# Patient Record
Sex: FEMALE | Race: BLACK OR AFRICAN AMERICAN | URBAN - METROPOLITAN AREA
[De-identification: names, ages, dates, MRNs, and addresses within clinical notes are randomized per-mention and may not be internally consistent; named-entity substitution may affect disease eponyms.]

---

## 2016-04-07 LAB — MAMMOGRAPHY, EXTERNAL: NORMAL

## 2017-01-06 ENCOUNTER — OFFICE VISIT (OUTPATIENT)
Dept: FAMILY MEDICINE CLINIC | Age: 67
End: 2017-01-06

## 2017-01-06 VITALS
SYSTOLIC BLOOD PRESSURE: 120 MMHG | OXYGEN SATURATION: 97 % | WEIGHT: 213 LBS | TEMPERATURE: 98.3 F | HEART RATE: 83 BPM | RESPIRATION RATE: 18 BRPM | BODY MASS INDEX: 33.43 KG/M2 | DIASTOLIC BLOOD PRESSURE: 90 MMHG | HEIGHT: 67 IN

## 2017-01-06 DIAGNOSIS — E78.2 MIXED HYPERLIPIDEMIA: Primary | ICD-10-CM

## 2017-01-06 DIAGNOSIS — E55.9 VITAMIN D DEFICIENCY: ICD-10-CM

## 2017-01-06 DIAGNOSIS — Z13.5 GLAUCOMA SCREENING: ICD-10-CM

## 2017-01-06 PROBLEM — G89.29 CHRONIC LEFT-SIDED LOW BACK PAIN WITH SCIATICA: Status: ACTIVE | Noted: 2017-01-06

## 2017-01-06 PROBLEM — M54.40 CHRONIC LEFT-SIDED LOW BACK PAIN WITH SCIATICA: Status: ACTIVE | Noted: 2017-01-06

## 2017-01-06 RX ORDER — ATORVASTATIN CALCIUM 20 MG/1
20 TABLET, FILM COATED ORAL DAILY
Qty: 90 TAB | Refills: 2 | Status: SHIPPED | OUTPATIENT
Start: 2017-01-06 | End: 2018-02-15 | Stop reason: SDUPTHER

## 2017-01-06 RX ORDER — DICLOFENAC SODIUM 10 MG/G
GEL TOPICAL 4 TIMES DAILY
Qty: 400 G | Refills: 1 | Status: SHIPPED | OUTPATIENT
Start: 2017-01-06 | End: 2017-03-29 | Stop reason: ALTCHOICE

## 2017-01-06 NOTE — PATIENT INSTRUCTIONS

## 2017-01-06 NOTE — PROGRESS NOTES
Gretta Spatz is a 74 y.o. female  Pt here today for follow up visit. 1. Have you been to the ER, urgent care clinic since your last visit? Hospitalized since your last visit? No    2. Have you seen or consulted any other health care providers outside of the [address] [place] [address] since your last visit? Include any pap smears or colon screening.  Yes Where: pain management

## 2017-01-06 NOTE — PROGRESS NOTES
Assessment/Plan:    Sven De Leon was seen today for hypertension. Diagnoses and all orders for this visit:    Mixed hyperlipidemia  -     atorvastatin (LIPITOR) 20 mg tablet; Take 1 Tab by mouth daily.  -     CBC WITH AUTOMATED DIFF; Future  -     HEPATIC FUNCTION PANEL; Future  -     LIPID PANEL; Future  -     METABOLIC PANEL, BASIC; Future  -     TSH 3RD GENERATION; Future  -     T4, FREE; Future  -     URINALYSIS W/ RFLX MICROSCOPIC; Future  -     VITAMIN D, 25 HYDROXY; Future    Glaucoma screening  -     REFERRAL TO OPHTHALMOLOGY    Vitamin D deficiency  -     VITAMIN D, 25 HYDROXY; Future    Other orders  -     diclofenac (VOLTAREN) 1 % gel; Apply  to affected area four (4) times daily. Will get a BP machine and start checking 1-2 times daily. Return in 6 weeks with log. Will then decide whether we need to start treating her for HTN. Physical in May 2017. BW. The plan was discussed with the patient. The patient verbalized understanding and is in agreement with the plan. All medication potential side effects were discussed with the patient.    -------------------------------------------------------------------------------------------------------------------        Tyler Navarro is a 74 y.o. female and presents with Hypertension         Subjective:  Pt here for BP elevated on the last visit. The diastolic is still elevated. She feels fine. ROS:  Constitutional: No recent weight change. No weakness/fatigue. No f/c. Skin: No rashes, change in nails/hair, itching   HENT: No HA, dizziness. No hearing loss/tinnitus. No nasal congestion/discharge. Eyes: No change in vision, double/blurred vision or eye pain/redness. Cardiovascular: No CP/palpitations. No GUTIÉRREZ/orthopnea/PND. Respiratory: No cough/sputum, dyspnea, wheezing. Gastointestinal: No dysphagia, reflux. No n/v. No constipation/diarrhea. No melena/rectal bleeding.    Genitourinary: No dysuria, urinary hesitancy, nocturia, hematuria. No incontinence. Musculoskeletal: No joint pain/stiffness. No muscle pain/tenderness. Endo: No heat/cold intolerance, no polyuria/polydypsia. Heme: No h/o anemia. No easy bleeding/bruising. Allergy/Immunology: No seasonal rhinitis. Denies frequent colds, sinus/ear infections. Neurological: No seizures/numbness/weakness. No paresthesias. Psychiatric:  No depression, anxiety. The problem list was updated as a part of today's visit. Patient Active Problem List   Diagnosis Code    HLD (hyperlipidemia) E78.5    GERD (gastroesophageal reflux disease) K21.9    Sciatica of left side M54.32    Osteoarthritis of both knees M17.0    Vitamin D deficiency E55.9    Advance directive discussed with patient Z70.80    Chronic left-sided low back pain with sciatica M54.40, G89.29       The PSH, FH were reviewed. SH:  Social History   Substance Use Topics    Smoking status: Former Smoker     Packs/day: 1.00     Quit date: 8/1/1993    Smokeless tobacco: Never Used    Alcohol use Yes      Comment: once a month, 1 mixed drink       Medications/Allergies:  Current Outpatient Prescriptions on File Prior to Visit   Medication Sig Dispense Refill    coenzyme q10 10 mg cap Take  by mouth.  omeprazole (PRILOSEC) 20 mg capsule Take 1 Cap by mouth daily. 90 Cap 3    ergocalciferol (VITAMIN D2) 50,000 unit capsule Take 1 Cap by mouth every seven (7) days. 12 Cap 3     No current facility-administered medications on file prior to visit.          No Known Allergies      Health Maintenance:   Health Maintenance   Topic Date Due    MEDICARE YEARLY EXAM  09/23/2016    GLAUCOMA SCREENING Q2Y  10/01/2016    BREAST CANCER SCRN MAMMOGRAM  04/06/2017    Pneumococcal 65+ Low/Medium Risk (2 of 2 - PPSV23) 10/13/2017    COLONOSCOPY  12/31/2024    DTaP/Tdap/Td series (2 - Td) 10/13/2026    Hepatitis C Screening  Completed    OSTEOPOROSIS SCREENING (DEXA)  Completed    ZOSTER VACCINE AGE 60> Completed    INFLUENZA AGE 9 TO ADULT  Addressed       Objective:  Visit Vitals    /90    Pulse 83    Temp 98.3 °F (36.8 °C) (Oral)    Resp 18    Ht 5' 7\" (1.702 m)    Wt 213 lb (96.6 kg)    SpO2 97%    BMI 33.36 kg/m2          Nurses notes and VS reviewed. Physical Examination: General appearance - alert, well appearing, and in no distress  Chest - clear to auscultation, no wheezes, rales or rhonchi, symmetric air entry  Heart - normal rate, regular rhythm, normal S1, S2, no murmurs, rubs, clicks or gallops        Labwork and Ancillary Studies:    CBC w/Diff  Lab Results   Component Value Date/Time    WBC 4.2 10/04/2016 07:31 AM    HGB 13.9 10/04/2016 07:31 AM    PLATELET 288 20/15/4996 07:31 AM         Basic Metabolic Profile  Lab Results   Component Value Date/Time    Sodium 145 07/11/2016 09:47 AM    Potassium 4.6 07/11/2016 09:47 AM    Chloride 102 07/11/2016 09:47 AM    CO2 27 07/11/2016 09:47 AM    Anion gap 7 03/08/2016 12:00 PM    Glucose 89 07/11/2016 09:47 AM    BUN 12 07/11/2016 09:47 AM    Creatinine 0.68 07/11/2016 09:47 AM    BUN/Creatinine ratio 18 07/11/2016 09:47 AM    GFR est  07/11/2016 09:47 AM    GFR est non-AA 90 07/11/2016 09:47 AM    Calcium 9.5 07/11/2016 09:47 AM         LFT  Lab Results   Component Value Date/Time    ALT 14 10/04/2016 07:31 AM    AST 15 10/04/2016 07:31 AM    Alk.  phosphatase 78 10/04/2016 07:31 AM    Bilirubin, direct 0.12 10/04/2016 07:31 AM    Bilirubin, total 0.4 10/04/2016 07:31 AM         Cholesterol  Lab Results   Component Value Date/Time    Cholesterol, total 189 10/04/2016 07:31 AM    HDL Cholesterol 75 10/04/2016 07:31 AM    LDL, calculated 99 10/04/2016 07:31 AM    Triglyceride 74 10/04/2016 07:31 AM    CHOL/HDL Ratio 3.8 04/04/2016 08:32 AM

## 2017-01-06 NOTE — MR AVS SNAPSHOT
Visit Information Date & Time Provider Department Dept. Phone Encounter #  
 1/6/2017  9:30 AM Guillermo Flores, [facility] [phone] [id number] Upcoming Health Maintenance Date Due  
 MEDICARE YEARLY EXAM 9/23/2016 GLAUCOMA SCREENING Q2Y 10/1/2016 BREAST CANCER SCRN MAMMOGRAM 4/6/2017 Pneumococcal 65+ Low/Medium Risk (2 of 2 - PPSV23) 10/13/2017 COLONOSCOPY 12/31/2024 DTaP/Tdap/Td series (2 - Td) 10/13/2026 Allergies as of 1/6/2017  Review Complete On: 1/6/2017 By: Guillermo Flores MD  
 No Known Allergies Current Immunizations  Reviewed on 4/18/2016 No immunizations on file. Not reviewed this visit You Were Diagnosed With   
  
 Codes Comments Mixed hyperlipidemia    -  Primary ICD-10-CM: G01.0 ICD-9-CM: 272.2 Glaucoma screening     ICD-10-CM: Z13.5 ICD-9-CM: V80.1 Vitamin D deficiency     ICD-10-CM: E55.9 ICD-9-CM: 268.9 Vitals BP Pulse Temp Resp Height(growth percentile) Weight(growth percentile) 120/90 83 98.3 °F (36.8 °C) (Oral) 18 5' 7\" (1.702 m) 213 lb (96.6 kg) SpO2 BMI OB Status Smoking Status 97% 33.36 kg/m2 Postmenopausal Former Smoker Vitals History BMI and BSA Data Body Mass Index Body Surface Area  
 33.36 kg/m 2 2.14 m 2 Preferred Pharmacy Pharmacy Name Phone [facility] [id number] [phone] Your Updated Medication List  
  
   
This list is accurate as of: 1/6/17 10:17 AM.  Always use your most recent med list.  
  
  
  
  
 atorvastatin 20 mg tablet Commonly known as:  LIPITOR Take 1 Tab by mouth daily. coenzyme q10 10 mg Cap Take  by mouth. diclofenac 1 % Gel Commonly known as:  VOLTAREN Apply  to affected area four (4) times daily. ergocalciferol 50,000 unit capsule Commonly known as:  VITAMIN D2 Take 1 Cap by mouth every seven (7) days. omeprazole 20 mg capsule Commonly known as:  PriLOSEC Take 1 Cap by mouth daily. Prescriptions Printed Refills  
 diclofenac (VOLTAREN) 1 % gel 1 Sig: Apply  to affected area four (4) times daily. Class: Print Route: Topical  
  
Prescriptions Sent to Pharmacy Refills  
 atorvastatin (LIPITOR) 20 mg tablet 2 Sig: Take 1 Tab by mouth daily. Class: Normal  
 Pharmacy: [place] [id number] [address] [facility], [address] [place] [address] Ph #: [phone] Route: Oral  
  
We Performed the Following REFERRAL TO OPHTHALMOLOGY [REF57 Custom] Comments:  
 PT HAS AN APPT WITH WILFREDO ALMAZANTHEN To-Do List   
 01/06/2017 Lab:  CBC WITH AUTOMATED DIFF   
  
 01/06/2017 Lab:  HEPATIC FUNCTION PANEL   
  
 01/06/2017 Lab:  LIPID PANEL   
  
 01/06/2017 Lab:  METABOLIC PANEL, BASIC   
  
 01/06/2017 Lab:  T4, FREE   
  
 01/06/2017 Lab:  TSH 3RD GENERATION   
  
 01/06/2017 Lab:  URINALYSIS W/ RFLX MICROSCOPIC   
  
 01/06/2017 Lab:  VITAMIN D, 25 HYDROXY Referral Information Referral ID Referred By Referred To  
  
 [id number] Ghanshyam LLANOS Not Available Visits Status Start Date End Date 1 New Request 1/6/17 1/6/18 If your referral has a status of pending review or denied, additional information will be sent to support the outcome of this decision. Patient Instructions High Cholesterol: Care Instructions Your Care Instructions Cholesterol is a type of fat in your blood. It is needed for many body functions, such as making new cells. Cholesterol is made by your body. It also comes from food you eat. High cholesterol means that you have too much of the fat in your blood. This raises your risk of a heart attack and stroke. LDL and HDL are part of your total cholesterol. LDL is the \"bad\" cholesterol. High LDL can raise your risk for heart disease, heart attack, and stroke. HDL is the \"good\" cholesterol.  It helps clear bad cholesterol from the body. High HDL is linked with a lower risk of heart disease, heart attack, and stroke. Your cholesterol levels help your doctor find out your risk for having a heart attack or stroke. You and your doctor can talk about whether you need to lower your risk and what treatment is best for you. A heart-healthy lifestyle along with medicines can help lower your cholesterol and your risk. The way you choose to lower your risk will depend on how high your risk is for heart attack and stroke. It will also depend on how you feel about taking medicines. Follow-up care is a key part of your treatment and safety. Be sure to make and go to all appointments, and call your doctor if you are having problems. It's also a good idea to know your test results and keep a list of the medicines you take. How can you care for yourself at home? · Eat a variety of foods every day. Good choices include fruits, vegetables, whole grains (like oatmeal), dried beans and peas, nuts and seeds, soy products (like tofu), and fat-free or low-fat dairy products. · Replace butter, margarine, and hydrogenated or partially hydrogenated oils with olive and canola oils. (Canola oil margarine without trans fat is fine.) · Replace red meat with fish, poultry, and soy protein (like tofu). · Limit processed and packaged foods like chips, crackers, and cookies. · Bake, broil, or steam foods. Don't dias them. · Be physically active. Get at least 30 minutes of exercise on most days of the week. Walking is a good choice. You also may want to do other activities, such as running, swimming, cycling, or playing tennis or team sports. · Stay at a healthy weight or lose weight by making the changes in eating and physical activity listed above. Losing just a small amount of weight, even 5 to 10 pounds, can reduce your risk for having a heart attack or stroke. · Do not smoke. When should you call for help? Watch closely for changes in your health, and be sure to contact your doctor if: 
· You need help making lifestyle changes. · You have questions about your medicine. Where can you learn more? Go to http://stephanie-mariella.info/. Enter M748 in the search box to learn more about \"High Cholesterol: Care Instructions. \" Current as of: January 27, 2016 Content Version: 11.1 © 0226-9259 DATY. Care instructions adapted under license by [employer] (which disclaims liability or warranty for this information). If you have questions about a medical condition or this instruction, always ask your healthcare professional. [address] any warranty or liability for your use of this information. Introducing hospitals & HEALTH SERVICES! Dorothy Gay introduces [facility] patient portal. Now you can access parts of your medical record, email your doctor's office, and request medication refills online. 1. In your internet browser, go to [email or web address] 2. Click on the First Time User? Click Here link in the Sign In box. You will see the New Member Sign Up page. 3. Enter your [facility] Access Code exactly as it appears below. You will not need to use this code after youve completed the sign-up process. If you do not sign up before the expiration date, you must request a new code. · [facility] Access Code: [email or web address] Expires: 4/6/2017 10:16 AM 
 
4. Enter the last four digits of your Social Security Number (xxxx) and Date of Birth (mm/dd/yyyy) as indicated and click Submit. You will be taken to the next sign-up page. 5. Create a [facility] ID. This will be your [facility] login ID and cannot be changed, so think of one that is secure and easy to remember. 6. Create a [facility] password. You can change your password at any time. 7. Enter your Password Reset Question and Answer. This can be used at a later time if you forget your password. 8. Enter your e-mail address. You will receive e-mail notification when new information is available in [address] 9. Click Sign Up. You can now view and download portions of your medical record. 10. Click the Download Summary menu link to download a portable copy of your medical information. If you have questions, please visit the Frequently Asked Questions section of the [employer] website. Remember, [employer] is NOT to be used for urgent needs. For medical emergencies, dial 911. Now available from your iPhone and Android! Please provide this summary of care documentation to your next provider. Your primary care clinician is listed as [address]. If you have any questions after today's visit, please call [phone].

## 2017-01-19 DIAGNOSIS — I99.8 FLUCTUATING BLOOD PRESSURE: Primary | ICD-10-CM

## 2017-01-19 RX ORDER — ACETAMINOPHEN 500 MG
TABLET ORAL
Qty: 1 KIT | Refills: 0 | Status: SHIPPED | OUTPATIENT
Start: 2017-01-19

## 2017-02-17 ENCOUNTER — OFFICE VISIT (OUTPATIENT)
Dept: FAMILY MEDICINE CLINIC | Age: 67
End: 2017-02-17

## 2017-02-17 VITALS
TEMPERATURE: 98.1 F | WEIGHT: 206.8 LBS | OXYGEN SATURATION: 98 % | HEART RATE: 92 BPM | SYSTOLIC BLOOD PRESSURE: 132 MMHG | HEIGHT: 67 IN | RESPIRATION RATE: 20 BRPM | BODY MASS INDEX: 32.46 KG/M2 | DIASTOLIC BLOOD PRESSURE: 92 MMHG

## 2017-02-17 DIAGNOSIS — E55.9 VITAMIN D DEFICIENCY: ICD-10-CM

## 2017-02-17 DIAGNOSIS — Z71.89 ADVANCE DIRECTIVE DISCUSSED WITH PATIENT: ICD-10-CM

## 2017-02-17 DIAGNOSIS — Z13.6 SCREENING FOR ISCHEMIC HEART DISEASE: ICD-10-CM

## 2017-02-17 DIAGNOSIS — Z00.00 ROUTINE GENERAL MEDICAL EXAMINATION AT A HEALTH CARE FACILITY: Primary | ICD-10-CM

## 2017-02-17 DIAGNOSIS — Z13.39 SCREENING FOR ALCOHOLISM: ICD-10-CM

## 2017-02-17 DIAGNOSIS — E78.00 PURE HYPERCHOLESTEROLEMIA: ICD-10-CM

## 2017-02-17 DIAGNOSIS — Z13.1 SCREENING FOR DIABETES MELLITUS: ICD-10-CM

## 2017-02-17 DIAGNOSIS — Z13.5 GLAUCOMA SCREENING: ICD-10-CM

## 2017-02-17 NOTE — PATIENT INSTRUCTIONS
Medicare Part B Preventive Services Limitations Recommendation Scheduled   Bone Mass Measurement  (age 72 & older, biennial) Requires diagnosis related to osteoporosis or estrogen deficiency. Biennial benefit unless patient has history of long-term glucocorticoid tx or baseline is needed because initial test was by other method     Cardiovascular Screening Blood Tests (every 5 years)  Total cholesterol, HDL, Triglycerides Order as a panel if possible     Colorectal Cancer Screening  -Fecal occult blood test (annual)  -Flexible sigmoidoscopy (5y)  -Screening colonoscopy (10y)  -Barium Enema      Counseling to Prevent Tobacco Use (up to 8 sessions per year)  - Counseling greater than 3 and up to 10 minutes  - Counseling greater than 10 minutes Patients must be asymptomatic of tobacco-related conditions to receive as preventive service     Diabetes Screening Tests (at least every 3 years, Medicare covers annually or at 6-month intervals for prediabetic patients)    Fasting blood sugar (FBS) or glucose tolerance test (GTT) Patient must be diagnosed with one of the following:  -Hypertension, Dyslipidemia, obesity, previous impaired FBS or GTT  Or any two of the following: overweight, FH of diabetes, age ? 72, history of gestational diabetes, birth of baby weighing more than 9 pounds     Diabetes Self-Management Training (DSMT) (no USPSTF recommendation) Requires referral by treating physician for patient with diabetes or renal disease. 10 hours of initial DSMT session of no less than 30 minutes each in a continuous 12-month period. 2 hours of follow-up DSMT in subsequent years.      Glaucoma Screening (no USPSTF recommendation) Diabetes mellitus, family history, , age 48 or over,  American, age 72 or over     Human Immunodeficiency Virus (HIV) Screening (annually for increased risk patients)  HIV-1 and HIV-2 by EIA, MARTY, rapid antibody test, or oral mucosa transudate Patient must be at increased risk for HIV infection per USPSTF guidelines or pregnant. Tests covered annually for patients at increased risk. Pregnant patients may receive up to 3 test during pregnancy. Medical Nutrition Therapy (MNT) (for diabetes or renal disease not recommended schedule) Requires referral by treating physician for patient with diabetes or renal disease. Can be provided in same year as diabetes self-management training (DSMT), and CMS recommends medical nutrition therapy take place after DSMT. Up to 3 hours for initial year and 2 hours in subsequent years. Shingles Vaccination A shingles vaccine is also recommended once in a lifetime after age 61     Seasonal Influenza Vaccination (annually)      Pneumococcal Vaccination (once after 72)      Hepatitis B Vaccinations (if medium/high risk) Medium/high risk factors:  End-stage renal disease,  Hemophiliacs who received Factor VIII or IX concentrates, Clients of institutions for the mentally retarded, Persons who live in the same house as a HepB virus carrier, Homosexual men, Illicit injectable drug abusers. Screening Mammography (biennial age 54-69) Annually (age 36 or over)     Screening Pap Tests and Pelvic Examination (up to age 79 and after 79 if unknown history or abnormal study last 10 years) Every 25 months except high risk     Ultrasound Screening for Abdominal Aortic Aneurysm (AAA) (once) Patient must be referred through [facility] and not have had a screening for abdominal aortic aneurysm before under Medicare.   Limited to patients who meet one of the following criteria:  - Men who are 73-68 years old and have smoked more than 100 cigarettes in their lifetime.  -Anyone with a FH of AAA  -Anyone recommended for screening by USPSTF

## 2017-02-17 NOTE — PROGRESS NOTES
Fernando Laguna is a 74 y.o. female here today for medicare wellness visit. 1. Have you been to the ER, urgent care clinic since your last visit? Hospitalized since your last visit? No    2. Have you seen or consulted any other health care providers outside of the [address] [place] [address] since your last visit? Include any pap smears or colon screening.  Yes Where: Orthopedic, Pain Management

## 2017-02-17 NOTE — MR AVS SNAPSHOT
Visit Information Date & Time Provider Department Dept. Phone Encounter #  
 2/17/2017  8:30 AM Lilia Mcgraw, 3 [facility] [phone] [id number] Upcoming Health Maintenance Date Due  
 MEDICARE YEARLY EXAM 9/23/2016 GLAUCOMA SCREENING Q2Y 10/1/2016 BREAST CANCER SCRN MAMMOGRAM 4/6/2017 Pneumococcal 65+ Low/Medium Risk (2 of 2 - PPSV23) 10/13/2017 COLONOSCOPY 12/31/2024 DTaP/Tdap/Td series (2 - Td) 10/13/2026 Allergies as of 2/17/2017  Review Complete On: 2/17/2017 By: Lilia Mcgraw MD  
 No Known Allergies Current Immunizations  Reviewed on 4/18/2016 No immunizations on file. Not reviewed this visit You Were Diagnosed With   
  
 Codes Comments Routine general medical examination at a health care facility    -  Primary ICD-10-CM: Z00.00 ICD-9-CM: V70.0 Pure hypercholesterolemia     ICD-10-CM: E78.00 ICD-9-CM: 272.0 Vitamin D deficiency     ICD-10-CM: E55.9 ICD-9-CM: 268.9 Glaucoma screening     ICD-10-CM: Z13.5 ICD-9-CM: V80.1 Screening for alcoholism     ICD-10-CM: Z13.89 ICD-9-CM: V79.1 Screening for diabetes mellitus     ICD-10-CM: Z13.1 ICD-9-CM: V77.1 Screening for ischemic heart disease     ICD-10-CM: Z13.6 ICD-9-CM: V81.0 Vitals BP Pulse Temp Resp Height(growth percentile) Weight(growth percentile) (!) 132/92 92 98.1 °F (36.7 °C) 20 5' 6.5\" (1.689 m) 206 lb 12.8 oz (93.8 kg) SpO2 BMI OB Status Smoking Status 98% 32.88 kg/m2 Postmenopausal Former Smoker Vitals History BMI and BSA Data Body Mass Index Body Surface Area  
 32.88 kg/m 2 2.1 m 2 Preferred Pharmacy Pharmacy Name Phone [facility], [address] [place] [address] [phone] Your Updated Medication List  
  
   
This list is accurate as of: 2/17/17  9:07 AM.  Always use your most recent med list.  
  
  
  
  
 atorvastatin 20 mg tablet Commonly known as:  LIPITOR Take 1 Tab by mouth daily. Blood Pressure Monitor Kit Commonly known as:  BLOOD PRESSURE KIT For daily use. Dx: I99.8  
  
 coenzyme q10 10 mg Cap Take  by mouth. diclofenac 1 % Gel Commonly known as:  VOLTAREN Apply  to affected area four (4) times daily. ergocalciferol 50,000 unit capsule Commonly known as:  VITAMIN D2 Take 1 Cap by mouth every seven (7) days. omeprazole 20 mg capsule Commonly known as:  PriLOSEC Take 1 Cap by mouth daily. We Performed the Following HM MAMMOGRAPHY [HM1 Custom] Comments: This external order was created through the Results Console. REFERRAL TO OPHTHALMOLOGY [REF57 Custom] Comments:  
 DO NOT SCHEDULE. Please get note from Dr. Joy Watkins Referral Information Referral ID Referred By Referred To  
  
 [id number] XPCXB, Rosaura Demarco [facility] [address] [facility], [address] Phone: [phone] Fax: [phone] Visits Status Start Date End Date 1 New Request 2/17/17 2/17/18 If your referral has a status of pending review or denied, additional information will be sent to support the outcome of this decision. Patient Instructions Medicare Part B Preventive Services Limitations Recommendation Scheduled Bone Mass Measurement 
(age 72 & older, biennial) Requires diagnosis related to osteoporosis or estrogen deficiency. Biennial benefit unless patient has history of long-term glucocorticoid tx or baseline is needed because initial test was by other method Cardiovascular Screening Blood Tests (every 5 years) Total cholesterol, HDL, Triglycerides Order as a panel if possible Colorectal Cancer Screening 
-Fecal occult blood test (annual) -Flexible sigmoidoscopy (5y) 
-Screening colonoscopy (10y) -Barium Enema Counseling to Prevent Tobacco Use (up to 8 sessions per year) - Counseling greater than 3 and up to 10 minutes - Counseling greater than 10 minutes Patients must be asymptomatic of tobacco-related conditions to receive as preventive service Diabetes Screening Tests (at least every 3 years, Medicare covers annually or at 6-month intervals for prediabetic patients) Fasting blood sugar (FBS) or glucose tolerance test (GTT) Patient must be diagnosed with one of the following: 
-Hypertension, Dyslipidemia, obesity, previous impaired FBS or GTT 
Or any two of the following: overweight, FH of diabetes, age ? 72, history of gestational diabetes, birth of baby weighing more than 9 pounds Diabetes Self-Management Training (DSMT) (no USPSTF recommendation) Requires referral by treating physician for patient with diabetes or renal disease. 10 hours of initial DSMT session of no less than 30 minutes each in a continuous 12-month period. 2 hours of follow-up DSMT in subsequent years. Glaucoma Screening (no USPSTF recommendation) Diabetes mellitus, family history, , age 48 or over,  American, age 72 or over Human Immunodeficiency Virus (HIV) Screening (annually for increased risk patients) HIV-1 and HIV-2 by EIA, MARTY, rapid antibody test, or oral mucosa transudate Patient must be at increased risk for HIV infection per USPSTF guidelines or pregnant. Tests covered annually for patients at increased risk. Pregnant patients may receive up to 3 test during pregnancy. Medical Nutrition Therapy (MNT) (for diabetes or renal disease not recommended schedule) Requires referral by treating physician for patient with diabetes or renal disease. Can be provided in same year as diabetes self-management training (DSMT), and CMS recommends medical nutrition therapy take place after DSMT. Up to 3 hours for initial year and 2 hours in subsequent years.     
Shingles Vaccination A shingles vaccine is also recommended once in a lifetime after age 61    
 Seasonal Influenza Vaccination (annually) Pneumococcal Vaccination (once after 65) Hepatitis B Vaccinations (if medium/high risk) Medium/high risk factors:  End-stage renal disease, Hemophiliacs who received Factor VIII or IX concentrates, Clients of institutions for the mentally retarded, Persons who live in the same house as a HepB virus carrier, Homosexual men, Illicit injectable drug abusers. Screening Mammography (biennial age 54-69) Annually (age 36 or over) Screening Pap Tests and Pelvic Examination (up to age 79 and after 79 if unknown history or abnormal study last 10 years) Every 24 months except high risk Ultrasound Screening for Abdominal Aortic Aneurysm (AAA) (once) Patient must be referred through IPPE and not have had a screening for abdominal aortic aneurysm before under Medicare. Limited to patients who meet one of the following criteria: 
- Men who are 73-68 years old and have smoked more than 100 cigarettes in their lifetime. 
-Anyone with a FH of AAA 
-Anyone recommended for screening by USPSTF Introducing [facility] & HEALTH SERVICES! [employer] introduces ViaSat patient portal. Now you can access parts of your medical record, email your doctor's office, and request medication refills online. 1. In your internet browser, go to [email or web address] 2. Click on the First Time User? Click Here link in the Sign In box. You will see the New Member Sign Up page. 3. Enter your ViaSat Access Code exactly as it appears below. You will not need to use this code after youve completed the sign-up process. If you do not sign up before the expiration date, you must request a new code. · ViaSat Access Code: [email or web address] Expires: 4/6/2017 10:16 AM 
 
4. Enter the last four digits of your Social Security Number (xxxx) and Date of Birth (mm/dd/yyyy) as indicated and click Submit. You will be taken to the next sign-up page. 5. Create a Hangar Seven ID. This will be your Hangar Seven login ID and cannot be changed, so think of one that is secure and easy to remember. 6. Create a Hangar Seven password. You can change your password at any time. 7. Enter your Password Reset Question and Answer. This can be used at a later time if you forget your password. 8. Enter your e-mail address. You will receive e-mail notification when new information is available in [address] 9. Click Sign Up. You can now view and download portions of your medical record. 10. Click the Download Summary menu link to download a portable copy of your medical information. If you have questions, please visit the Frequently Asked Questions section of the Hangar Seven website. Remember, Hangar Seven is NOT to be used for urgent needs. For medical emergencies, dial 911. Now available from your iPhone and Android! Please provide this summary of care documentation to your next provider. Your primary care clinician is listed as Teo Marr. If you have any questions after today's visit, please call [phone].

## 2017-02-17 NOTE — PROGRESS NOTES
This is a Subsequent Medicare Annual Wellness Visit providing Personalized Prevention Plan Services (PPPS) (Performed 12 months after initial AWV and PPPS )    I have reviewed the patient's medical history in detail and updated the computerized patient record. History     Past Medical History   Diagnosis Date    Chicken pox     Chronic left-sided low back pain with sciatica 1/6/2017    GERD (gastroesophageal reflux disease)     HLD (hyperlipidemia) 6/19/2015    Lumbar stenosis     Measles     Osteoarthritis of both knees 6/19/2015    Sciatica of left side 2009    Shoulder pain 4/13     Left of back going up to the shoulder    Vitamin D deficiency       Past Surgical History   Procedure Laterality Date    Hx gyn  1980     Fibroid Removal    Hx orthopaedic Bilateral 1981     Knee arthroscopy      Current Outpatient Prescriptions   Medication Sig Dispense Refill    Blood Pressure Monitor (BLOOD PRESSURE KIT) kit For daily use. Dx: I99.8 1 Kit 0    atorvastatin (LIPITOR) 20 mg tablet Take 1 Tab by mouth daily. 90 Tab 2    omeprazole (PRILOSEC) 20 mg capsule Take 1 Cap by mouth daily. 90 Cap 3    ergocalciferol (VITAMIN D2) 50,000 unit capsule Take 1 Cap by mouth every seven (7) days. 12 Cap 3    diclofenac (VOLTAREN) 1 % gel Apply  to affected area four (4) times daily. 400 g 1    coenzyme q10 10 mg cap Take  by mouth.        No Known Allergies  Family History   Problem Relation Age of Onset    Diabetes Mother      Social History   Substance Use Topics    Smoking status: Former Smoker     Packs/day: 1.00     Quit date: 8/1/1993    Smokeless tobacco: Never Used    Alcohol use Yes      Comment: once a month, 1 mixed drink     Patient Active Problem List   Diagnosis Code    HLD (hyperlipidemia) E78.5    GERD (gastroesophageal reflux disease) K21.9    Sciatica of left side M54.32    Osteoarthritis of both knees M17.0    Vitamin D deficiency E55.9    Advance directive discussed with patient Z71.89    Chronic left-sided low back pain with sciatica M54.40, G89.29       Depression Risk Factor Screening:     PHQ 2 / 9, over the last two weeks 2/17/2017   Little interest or pleasure in doing things Not at all   Feeling down, depressed or hopeless Not at all   Total Score PHQ 2 0     Alcohol Risk Factor Screening: On any occasion during the past 3 months, have you had more than 3 drinks containing alcohol? No    Do you average more than 7 drinks per week? No      Functional Ability and Level of Safety:     Hearing Loss   none    Activities of Daily Living   Self-care. Requires assistance with: no ADLs    Fall Risk     Fall Risk Assessment, last 12 mths 2/17/2017   Able to walk? Yes   Fall in past 12 months? No     Abuse Screen   Patient is not abused    Review of Systems   Pertinent items are noted in HPI. Physical Examination     Evaluation of Cognitive Function:  Mood/affect:  happy  Appearance: age appropriate  Family member/caregiver input: none    Visit Vitals    BP (!) 132/92    Pulse 92    Temp 98.1 °F (36.7 °C)    Resp 20    Ht 5' 6.5\" (1.689 m)    Wt 206 lb 12.8 oz (93.8 kg)    SpO2 98%    BMI 32.88 kg/m2     General appearance: alert, cooperative, no distress, appears stated age  Head: Normocephalic, without obvious abnormality, atraumatic  Throat: Lips, mucosa, and tongue normal. Teeth and gums normal  Neck: supple, symmetrical, trachea midline, no adenopathy, thyroid: not enlarged, symmetric, no tenderness/mass/nodules, no carotid bruit and no JVD  Back: symmetric, no curvature. ROM normal. No CVA tenderness. Lungs: clear to auscultation bilaterally  Heart: regular rate and rhythm, S1, S2 normal, no murmur, click, rub or gallop  Abdomen: soft, non-tender.  Bowel sounds normal. No masses,  no organomegaly  Extremities: extremities normal, atraumatic, no cyanosis or edema  Pulses: 2+ and symmetric    Patient Care Team:  Lopez Yarbrough MD as PCP - General (Internal Medicine)  Lidya Richter MD (Surgery)  Bharat Anders MD (Obstetrics & Gynecology)  Anabel Gonsales MD (Ophthalmology)    Advice/Referrals/Counseling   Education and counseling provided:  Are appropriate based on today's review and evaluation    Assessment/Plan       ICD-10-CM ICD-9-CM    1. Routine general medical examination at a health care facility Z00.00 V70.0    2. Pure hypercholesterolemia E78.00 272.0    3. Vitamin D deficiency E55.9 268.9    4. Glaucoma screening Z13.5 V80.1 REFERRAL TO OPHTHALMOLOGY      CANCELED: REFERRAL TO OPHTHALMOLOGY   5. Screening for alcoholism Z13.89 V79.1    6. Screening for diabetes mellitus Z13.1 V77.1    7. Screening for ischemic heart disease Z13.6 V81.0    8. Advance directive discussed with patient Z71.89 V65.49    . Advance Care Planning (ACP) Provider Conversation Snapshot    Date of ACP Conversation: 02/17/17  Persons included in Conversation:  patient  Length of ACP Conversation in minutes:  <16 minutes (Non-Billable)    Authorized Decision Maker (if patient is incapable of making informed decisions): This person is:   Healthcare Agent/Medical Power of  under Advance Directive            Conversation Outcomes / Follow-Up Plan:   Recommended completion of Advance Directive form after review of ACP materials and conversation with prospective healthcare agent         Will f/u in 6 weeks with her BP log again. She has a tendency to run mildly elevated with her diastolic. She feels the CS injection she received 2 mon ago has been a contributing factor. She wishes to monitor things a little longer ans if still high on next visit, will need to treat.

## 2017-03-29 ENCOUNTER — OFFICE VISIT (OUTPATIENT)
Dept: FAMILY MEDICINE CLINIC | Age: 67
End: 2017-03-29

## 2017-03-29 VITALS
WEIGHT: 207.6 LBS | HEIGHT: 67 IN | RESPIRATION RATE: 20 BRPM | DIASTOLIC BLOOD PRESSURE: 94 MMHG | HEART RATE: 88 BPM | BODY MASS INDEX: 32.58 KG/M2 | TEMPERATURE: 98.8 F | SYSTOLIC BLOOD PRESSURE: 146 MMHG | OXYGEN SATURATION: 98 %

## 2017-03-29 DIAGNOSIS — I10 ESSENTIAL HYPERTENSION: Primary | ICD-10-CM

## 2017-03-29 RX ORDER — LISINOPRIL 5 MG/1
5 TABLET ORAL DAILY
Qty: 90 TAB | Refills: 1 | Status: SHIPPED | OUTPATIENT
Start: 2017-03-29 | End: 2018-03-21

## 2017-03-29 NOTE — MR AVS SNAPSHOT
Visit Information Date & Time Provider Department Dept. Phone Encounter #  
 3/29/2017  8:30 AM Sumaya An, [place] [facility] [phone] [id number] Upcoming Health Maintenance Date Due Pneumococcal 65+ Low/Medium Risk (2 of 2 - PPSV23) 10/13/2017 MEDICARE YEARLY EXAM 2/18/2018 BREAST CANCER SCRN MAMMOGRAM 4/7/2018 GLAUCOMA SCREENING Q2Y 1/12/2019 COLONOSCOPY 12/31/2024 DTaP/Tdap/Td series (2 - Td) 10/13/2026 Allergies as of 3/29/2017  Review Complete On: 3/29/2017 By: Sumaya An MD  
 No Known Allergies Current Immunizations  Reviewed on 4/18/2016 No immunizations on file. Not reviewed this visit You Were Diagnosed With   
  
 Codes Comments Essential hypertension    -  Primary ICD-10-CM: I10 
ICD-9-CM: 401.9 Vitals BP Pulse Temp Resp Height(growth percentile) Weight(growth percentile) (!) 146/94 88 98.8 °F (37.1 °C) 20 5' 6.5\" (1.689 m) 207 lb 9.6 oz (94.2 kg) SpO2 BMI OB Status Smoking Status 98% 33.01 kg/m2 Postmenopausal Former Smoker Vitals History BMI and BSA Data Body Mass Index Body Surface Area 33.01 kg/m 2 2.1 m 2 Preferred Pharmacy Pharmacy Name Phone [facility], [address] [place] [address] [phone] Your Updated Medication List  
  
   
This list is accurate as of: 3/29/17  9:06 AM.  Always use your most recent med list.  
  
  
  
  
 atorvastatin 20 mg tablet Commonly known as:  LIPITOR Take 1 Tab by mouth daily. Blood Pressure Monitor Kit Commonly known as:  BLOOD PRESSURE KIT For daily use. Dx: I99.8  
  
 coenzyme q10 10 mg Cap Take  by mouth.  
  
 ergocalciferol 50,000 unit capsule Commonly known as:  VITAMIN D2 Take 1 Cap by mouth every seven (7) days. lisinopril 5 mg tablet Commonly known as:  Sowmya Shi Take 1 Tab by mouth daily. Indications: hypertension  
  
 omeprazole 20 mg capsule Commonly known as:  PriLOSEC Take 1 Cap by mouth daily. Prescriptions Sent to Pharmacy Refills  
 lisinopril (PRINIVIL, ZESTRIL) 5 mg tablet 1 Sig: Take 1 Tab by mouth daily. Indications: hypertension Class: Normal  
 Pharmacy: [address] [facility], [address] [facility] #: [phone] Route: Oral  
  
Patient Instructions High Blood Pressure: Care Instructions Your Care Instructions If your blood pressure is usually above 140/90, you have high blood pressure, or hypertension. That means the top number is 140 or higher or the bottom number is 90 or higher, or both. Despite what a lot of people think, high blood pressure usually doesn't cause headaches or make you feel dizzy or lightheaded. It usually has no symptoms. But it does increase your risk for heart attack, stroke, and kidney or eye damage. The higher your blood pressure, the more your risk increases. Your doctor will give you a goal for your blood pressure. Your goal will be based on your health and your age. An example of a goal is to keep your blood pressure below 140/90. Lifestyle changes, such as eating healthy and being active, are always important to help lower blood pressure. You might also take medicine to reach your blood pressure goal. 
Follow-up care is a key part of your treatment and safety. Be sure to make and go to all appointments, and call your doctor if you are having problems. It's also a good idea to know your test results and keep a list of the medicines you take. How can you care for yourself at home? Medical treatment · If you stop taking your medicine, your blood pressure will go back up. You may take one or more types of medicine to lower your blood pressure. Be safe with medicines. Take your medicine exactly as prescribed. Call your doctor if you think you are having a problem with your medicine. · Talk to your doctor before you start taking aspirin every day.  Aspirin can help certain people lower their risk of a heart attack or stroke. But taking aspirin isn't right for everyone, because it can cause serious bleeding. · See your doctor regularly. You may need to see the doctor more often at first or until your blood pressure comes down. · If you are taking blood pressure medicine, talk to your doctor before you take decongestants or anti-inflammatory medicine, such as ibuprofen. Some of these medicines can raise blood pressure. · Learn how to check your blood pressure at home. Lifestyle changes · Stay at a healthy weight. This is especially important if you put on weight around the waist. Losing even 10 pounds can help you lower your blood pressure. · If your doctor recommends it, get more exercise. Walking is a good choice. Bit by bit, increase the amount you walk every day. Try for at least 30 minutes on most days of the week. You also may want to swim, bike, or do other activities. · Avoid or limit alcohol. Talk to your doctor about whether you can drink any alcohol. · Try to limit how much sodium you eat to less than 2,300 milligrams (mg) a day. Your doctor may ask you to try to eat less than 1,500 mg a day. · Eat plenty of fruits (such as bananas and oranges), vegetables, legumes, whole grains, and low-fat dairy products. · Lower the amount of saturated fat in your diet. Saturated fat is found in animal products such as milk, cheese, and meat. Limiting these foods may help you lose weight and also lower your risk for heart disease. · Do not smoke. Smoking increases your risk for heart attack and stroke. If you need help quitting, talk to your doctor about stop-smoking programs and medicines. These can increase your chances of quitting for good. When should you call for help? Call 911 anytime you think you may need emergency care.  This may mean having symptoms that suggest that your blood pressure is causing a serious heart or blood vessel problem. Your blood pressure may be over 180/110. For example, call 911 if: 
· You have symptoms of a heart attack. These may include: ¨ Chest pain or pressure, or a strange feeling in the chest. 
¨ Sweating. ¨ Shortness of breath. ¨ Nausea or vomiting. ¨ Pain, pressure, or a strange feeling in the back, neck, jaw, or upper belly or in one or both shoulders or arms. ¨ Lightheadedness or sudden weakness. ¨ A fast or irregular heartbeat. · You have symptoms of a stroke. These may include: 
¨ Sudden numbness, tingling, weakness, or loss of movement in your face, arm, or leg, especially on only one side of your body. ¨ Sudden vision changes. ¨ Sudden trouble speaking. ¨ Sudden confusion or trouble understanding simple statements. ¨ Sudden problems with walking or balance. ¨ A sudden, severe headache that is different from past headaches. · You have severe back or belly pain. Do not wait until your blood pressure comes down on its own. Get help right away. Call your doctor now or seek immediate care if: 
· Your blood pressure is much higher than normal (such as 180/110 or higher), but you don't have symptoms. · You think high blood pressure is causing symptoms, such as: ¨ Severe headache. ¨ Blurry vision. Watch closely for changes in your health, and be sure to contact your doctor if: 
· Your blood pressure measures 140/90 or higher at least 2 times. That means the top number is 140 or higher or the bottom number is 90 or higher, or both. · You think you may be having side effects from your blood pressure medicine. · Your blood pressure is usually normal, but it goes above normal at least 2 times. Where can you learn more? Go to http://stephanie-mariella.info/. Enter T113 in the search box to learn more about \"High Blood Pressure: Care Instructions. \" Current as of: August 8, 2016 Content Version: 11.2 © 1753-1314 Healthwise, [employer]. Care instructions adapted under license by [employer] (which disclaims liability or warranty for this information). If you have questions about a medical condition or this instruction, always ask your healthcare professional. [address] any warranty or liability for your use of this information. Introducing [facility] & HEALTH SERVICES! [employer] introduces Twelixir patient portal. Now you can access parts of your medical record, email your doctor's office, and request medication refills online. 1. In your internet browser, go to [email or web address] 2. Click on the First Time User? Click Here link in the Sign In box. You will see the New Member Sign Up page. 3. Enter your Twelixir Access Code exactly as it appears below. You will not need to use this code after youve completed the sign-up process. If you do not sign up before the expiration date, you must request a new code. · Twelixir Access Code: [email or web address] Expires: 4/6/2017 11:16 AM 
 
4. Enter the last four digits of your Social Security Number (xxxx) and Date of Birth (mm/dd/yyyy) as indicated and click Submit. You will be taken to the next sign-up page. 5. Create a Twelixir ID. This will be your Twelixir login ID and cannot be changed, so think of one that is secure and easy to remember. 6. Create a Twelixir password. You can change your password at any time. 7. Enter your Password Reset Question and Answer. This can be used at a later time if you forget your password. 8. Enter your e-mail address. You will receive e-mail notification when new information is available in [address] 9. Click Sign Up. You can now view and download portions of your medical record. 10. Click the Download Summary menu link to download a portable copy of your medical information.  
 
If you have questions, please visit the Frequently Asked Questions section of the [employer]. Remember, [employer] is NOT to be used for urgent needs. For medical emergencies, dial 911. Now available from your iPhone and Android! Please provide this summary of care documentation to your next provider. Your primary care clinician is listed as Teo Marr. If you have any questions after today's visit, please call [phone].

## 2017-03-29 NOTE — PROGRESS NOTES
Assessment/Plan:    Raymond Moody was seen today for follow-up. Diagnoses and all orders for this visit:    Essential hypertension  -     lisinopril (PRINIVIL, ZESTRIL) 5 mg tablet; Take 1 Tab by mouth daily. Indications: hypertension      F/u 4 weeks. The plan was discussed with the patient. The patient verbalized understanding and is in agreement with the plan. All medication potential side effects were discussed with the patient.    -------------------------------------------------------------------------------------------------------------------        Fernando Laguna is a 74 y.o. female and presents with Follow-up (from steroid injection)         Subjective:  Pt her for f/u. Doing well. Here to discuss her BP, is still mildly elevated. She has been resistant to starting meds but is now willing. ROS:  Constitutional: No recent weight change. No weakness/fatigue. No f/c. Skin: No rashes, change in nails/hair, itching   HENT: No HA, dizziness. No hearing loss/tinnitus. No nasal congestion/discharge. Eyes: No change in vision, double/blurred vision or eye pain/redness. Cardiovascular: No CP/palpitations. No GUTIÉRREZ/orthopnea/PND. Respiratory: No cough/sputum, dyspnea, wheezing. Gastointestinal: No dysphagia, reflux. No n/v. No constipation/diarrhea. No melena/rectal bleeding. Genitourinary: No dysuria, urinary hesitancy, nocturia, hematuria. No incontinence. Musculoskeletal: No joint pain/stiffness. No muscle pain/tenderness. Endo: No heat/cold intolerance, no polyuria/polydypsia. Heme: No h/o anemia. No easy bleeding/bruising. Allergy/Immunology: No seasonal rhinitis. Denies frequent colds, sinus/ear infections. Neurological: No seizures/numbness/weakness. No paresthesias. Psychiatric:  No depression, anxiety. The problem list was updated as a part of today's visit.   Patient Active Problem List   Diagnosis Code    HLD (hyperlipidemia) E78.5    GERD (gastroesophageal reflux disease) K21.9    Sciatica of left side M54.32    Osteoarthritis of both knees M17.0    Vitamin D deficiency E55.9    Advance directive discussed with patient Z70.80    Chronic left-sided low back pain with sciatica M54.40, G89.29       The PSH, FH were reviewed. SH:  Social History   Substance Use Topics    Smoking status: Former Smoker     Packs/day: 1.00     Quit date: 8/1/1993    Smokeless tobacco: Never Used    Alcohol use Yes      Comment: once a month, 1 mixed drink       Medications/Allergies:  Current Outpatient Prescriptions on File Prior to Visit   Medication Sig Dispense Refill    Blood Pressure Monitor (BLOOD PRESSURE KIT) kit For daily use. Dx: I99.8 1 Kit 0    atorvastatin (LIPITOR) 20 mg tablet Take 1 Tab by mouth daily. 90 Tab 2    coenzyme q10 10 mg cap Take  by mouth.  omeprazole (PRILOSEC) 20 mg capsule Take 1 Cap by mouth daily. 90 Cap 3    ergocalciferol (VITAMIN D2) 50,000 unit capsule Take 1 Cap by mouth every seven (7) days. 12 Cap 3     No current facility-administered medications on file prior to visit. No Known Allergies      Health Maintenance:   Health Maintenance   Topic Date Due    Pneumococcal 65+ Low/Medium Risk (2 of 2 - PPSV23) 10/13/2017    MEDICARE YEARLY EXAM  02/18/2018    BREAST CANCER SCRN MAMMOGRAM  04/07/2018    GLAUCOMA SCREENING Q2Y  01/12/2019    COLONOSCOPY  12/31/2024    DTaP/Tdap/Td series (2 - Td) 10/13/2026    Hepatitis C Screening  Completed    OSTEOPOROSIS SCREENING (DEXA)  Completed    ZOSTER VACCINE AGE 60>  Completed    INFLUENZA AGE 9 TO ADULT  Addressed       Objective:  Visit Vitals    BP (!) 146/94    Pulse 88    Temp 98.8 °F (37.1 °C)    Resp 20    Ht 5' 6.5\" (1.689 m)    Wt 207 lb 9.6 oz (94.2 kg)    SpO2 98%    BMI 33.01 kg/m2          Nurses notes and VS reviewed.       Physical Examination: General appearance - alert, well appearing, and in no distress        Labwork and Ancillary Studies:    CBC w/Diff  Lab Results   Component Value Date/Time    WBC 4.2 10/04/2016 07:31 AM    HGB 13.9 10/04/2016 07:31 AM    PLATELET 870 00/46/1150 07:31 AM         Basic Metabolic Profile  Lab Results   Component Value Date/Time    Sodium 145 07/11/2016 09:47 AM    Potassium 4.6 07/11/2016 09:47 AM    Chloride 102 07/11/2016 09:47 AM    CO2 27 07/11/2016 09:47 AM    Anion gap 7 03/08/2016 12:00 PM    Glucose 89 07/11/2016 09:47 AM    BUN 12 07/11/2016 09:47 AM    Creatinine 0.68 07/11/2016 09:47 AM    BUN/Creatinine ratio 18 07/11/2016 09:47 AM    GFR est  07/11/2016 09:47 AM    GFR est non-AA 90 07/11/2016 09:47 AM    Calcium 9.5 07/11/2016 09:47 AM         LFT  Lab Results   Component Value Date/Time    ALT (SGPT) 14 10/04/2016 07:31 AM    AST (SGOT) 15 10/04/2016 07:31 AM    Alk.  phosphatase 78 10/04/2016 07:31 AM    Bilirubin, direct 0.12 10/04/2016 07:31 AM    Bilirubin, total 0.4 10/04/2016 07:31 AM         Cholesterol  Lab Results   Component Value Date/Time    Cholesterol, total 189 10/04/2016 07:31 AM    HDL Cholesterol 75 10/04/2016 07:31 AM    LDL, calculated 99 10/04/2016 07:31 AM    Triglyceride 74 10/04/2016 07:31 AM    CHOL/HDL Ratio 3.8 04/04/2016 08:32 AM

## 2017-03-29 NOTE — PROGRESS NOTES
Spring Henderson is a 74 y.o. female here today for 6 week follow-up from steroid injection. 1. Have you been to the ER, urgent care clinic since your last visit? Hospitalized since your last visit? No    2. Have you seen or consulted any other health care providers outside of the [address] [place] [address] since your last visit? Include any pap smears or colon screening.  No

## 2017-04-24 ENCOUNTER — OFFICE VISIT (OUTPATIENT)
Dept: FAMILY MEDICINE CLINIC | Age: 67
End: 2017-04-24

## 2017-04-24 VITALS
RESPIRATION RATE: 17 BRPM | WEIGHT: 209 LBS | HEART RATE: 89 BPM | DIASTOLIC BLOOD PRESSURE: 88 MMHG | OXYGEN SATURATION: 98 % | TEMPERATURE: 97.1 F | HEIGHT: 66 IN | BODY MASS INDEX: 33.59 KG/M2 | SYSTOLIC BLOOD PRESSURE: 138 MMHG

## 2017-04-24 DIAGNOSIS — I10 ESSENTIAL HYPERTENSION: Primary | ICD-10-CM

## 2017-04-24 NOTE — PROGRESS NOTES
Maryann Talavera is a 69 y.o. female presents to office for HTN, VIT D, and cholesterol      1. Have you been to the ER, urgent care clinic or hospitalized since your last visit? no  2. Have you seen any other providers outside of [facility] since your last visit? no  3. Have you had a Flu shot this year? no       Health Maintenance items with a due date reviewed with patient:  There are no preventive care reminders to display for this patient.

## 2017-04-24 NOTE — PROGRESS NOTES
Assessment/Plan:    Steve Maravilla was seen today for hypertension, cholesterol problem and vitamin d deficiency. Diagnoses and all orders for this visit:    Essential hypertension        Will continue with the Lisinopril for now. She wants to observe her symptoms and see how she does, see if they improve. The plan was discussed with the patient. The patient verbalized understanding and is in agreement with the plan. All medication potential side effects were discussed with the patient.    -------------------------------------------------------------------------------------------------------------------        Arnulfo Barbosa is a 69 y.o. female and presents with Hypertension; Cholesterol Problem; and Vitamin D Deficiency         Subjective:  Pt here for f/u of HTN. She has noted a change in the texture and appearance of her hair, says her hairdresser noted the difference as well. She also has noted some sleepiness in the daytime, takes the pill at night. She has also been monitoring her BP at home, says her machine is 3 pts higher on the diastolic. She does not want to change the medication, she wants to give it more time. ROS:  Constitutional: No recent weight change. No weakness/fatigue. No f/c. Skin: No rashes, change in nails/hair, itching   HENT: No HA, dizziness. No hearing loss/tinnitus. No nasal congestion/discharge. Eyes: No change in vision, double/blurred vision or eye pain/redness. Cardiovascular: No CP/palpitations. No GUTIÉRREZ/orthopnea/PND. Respiratory: No cough/sputum, dyspnea, wheezing. Gastointestinal: No dysphagia, reflux. No n/v. No constipation/diarrhea. No melena/rectal bleeding. Genitourinary: No dysuria, urinary hesitancy, nocturia, hematuria. No incontinence. Musculoskeletal: No joint pain/stiffness. No muscle pain/tenderness. Endo: No heat/cold intolerance, no polyuria/polydypsia. Heme: No h/o anemia. No easy bleeding/bruising.    Allergy/Immunology: No seasonal rhinitis. Denies frequent colds, sinus/ear infections. Neurological: No seizures/numbness/weakness. No paresthesias. Psychiatric:  No depression, anxiety. The problem list was updated as a part of today's visit. Patient Active Problem List   Diagnosis Code    HLD (hyperlipidemia) E78.5    GERD (gastroesophageal reflux disease) K21.9    Sciatica of left side M54.32    Osteoarthritis of both knees M17.0    Vitamin D deficiency E55.9    Advance directive discussed with patient Z70.80    Chronic left-sided low back pain with sciatica M54.40, G89.29       The PSH, FH were reviewed. SH:  Social History   Substance Use Topics    Smoking status: Former Smoker     Packs/day: 1.00     Quit date: 8/1/1993    Smokeless tobacco: Never Used    Alcohol use Yes      Comment: once a month, 1 mixed drink       Medications/Allergies:  Current Outpatient Prescriptions on File Prior to Visit   Medication Sig Dispense Refill    lisinopril (PRINIVIL, ZESTRIL) 5 mg tablet Take 1 Tab by mouth daily. Indications: hypertension 90 Tab 1    Blood Pressure Monitor (BLOOD PRESSURE KIT) kit For daily use. Dx: I99.8 1 Kit 0    atorvastatin (LIPITOR) 20 mg tablet Take 1 Tab by mouth daily. 90 Tab 2    coenzyme q10 10 mg cap Take  by mouth.  omeprazole (PRILOSEC) 20 mg capsule Take 1 Cap by mouth daily. 90 Cap 3    ergocalciferol (VITAMIN D2) 50,000 unit capsule Take 1 Cap by mouth every seven (7) days. 12 Cap 3     No current facility-administered medications on file prior to visit.          No Known Allergies      Health Maintenance:   Health Maintenance   Topic Date Due    Pneumococcal 65+ Low/Medium Risk (2 of 2 - PPSV23) 10/13/2017    MEDICARE YEARLY EXAM  02/18/2018    GLAUCOMA SCREENING Q2Y  01/12/2019    BREAST CANCER SCRN MAMMOGRAM  04/10/2019    COLONOSCOPY  12/31/2024    DTaP/Tdap/Td series (2 - Td) 10/13/2026    Hepatitis C Screening  Completed    OSTEOPOROSIS SCREENING (DEXA) Completed    ZOSTER VACCINE AGE 60>  Completed    INFLUENZA AGE 9 TO ADULT  Addressed       Objective:  Visit Vitals    /88 (BP 1 Location: Left arm, BP Patient Position: Sitting)    Pulse 89    Temp 97.1 °F (36.2 °C) (Oral)    Resp 17    Ht 5' 6\" (1.676 m)    Wt 209 lb (94.8 kg)    SpO2 98%    BMI 33.73 kg/m2          Nurses notes and VS reviewed. Physical Examination: General appearance - alert, well appearing, and in no distress  Heart - normal rate and regular rhythm      Labwork and Ancillary Studies:    CBC w/Diff  Lab Results   Component Value Date/Time    WBC 4.2 10/04/2016 07:31 AM    HGB 13.9 10/04/2016 07:31 AM    PLATELET 813 90/41/0767 07:31 AM         Basic Metabolic Profile  Lab Results   Component Value Date/Time    Sodium 145 07/11/2016 09:47 AM    Potassium 4.6 07/11/2016 09:47 AM    Chloride 102 07/11/2016 09:47 AM    CO2 27 07/11/2016 09:47 AM    Anion gap 7 03/08/2016 12:00 PM    Glucose 89 07/11/2016 09:47 AM    BUN 12 07/11/2016 09:47 AM    Creatinine 0.68 07/11/2016 09:47 AM    BUN/Creatinine ratio 18 07/11/2016 09:47 AM    GFR est  07/11/2016 09:47 AM    GFR est non-AA 90 07/11/2016 09:47 AM    Calcium 9.5 07/11/2016 09:47 AM         LFT  Lab Results   Component Value Date/Time    ALT (SGPT) 14 10/04/2016 07:31 AM    AST (SGOT) 15 10/04/2016 07:31 AM    Alk.  phosphatase 78 10/04/2016 07:31 AM    Bilirubin, direct 0.12 10/04/2016 07:31 AM    Bilirubin, total 0.4 10/04/2016 07:31 AM         Cholesterol  Lab Results   Component Value Date/Time    Cholesterol, total 189 10/04/2016 07:31 AM    HDL Cholesterol 75 10/04/2016 07:31 AM    LDL, calculated 99 10/04/2016 07:31 AM    Triglyceride 74 10/04/2016 07:31 AM    CHOL/HDL Ratio 3.8 04/04/2016 08:32 AM

## 2017-04-24 NOTE — PATIENT INSTRUCTIONS

## 2017-06-26 ENCOUNTER — OFFICE VISIT (OUTPATIENT)
Dept: FAMILY MEDICINE CLINIC | Age: 67
End: 2017-06-26

## 2017-06-26 VITALS
SYSTOLIC BLOOD PRESSURE: 132 MMHG | OXYGEN SATURATION: 96 % | HEART RATE: 81 BPM | HEIGHT: 66 IN | WEIGHT: 208 LBS | DIASTOLIC BLOOD PRESSURE: 82 MMHG | RESPIRATION RATE: 16 BRPM | BODY MASS INDEX: 33.43 KG/M2 | TEMPERATURE: 98.2 F

## 2017-06-26 DIAGNOSIS — E55.9 VITAMIN D DEFICIENCY: ICD-10-CM

## 2017-06-26 DIAGNOSIS — I10 ESSENTIAL HYPERTENSION: ICD-10-CM

## 2017-06-26 DIAGNOSIS — E78.00 PURE HYPERCHOLESTEROLEMIA: Primary | ICD-10-CM

## 2017-06-26 NOTE — MR AVS SNAPSHOT
Visit Information Date & Time Provider Department Dept. Phone Encounter #  
 6/26/2017  8:30 AM Melisa Llanes, 3 [facility] [phone] [id number] Upcoming Health Maintenance Date Due INFLUENZA AGE 9 TO ADULT 8/1/2017 Pneumococcal 65+ Low/Medium Risk (2 of 2 - PPSV23) 10/13/2017 MEDICARE YEARLY EXAM 2/18/2018 GLAUCOMA SCREENING Q2Y 1/12/2019 BREAST CANCER SCRN MAMMOGRAM 4/10/2019 COLONOSCOPY 12/31/2024 DTaP/Tdap/Td series (2 - Td) 10/13/2026 Allergies as of 6/26/2017  Review Complete On: 6/26/2017 By: Melias Llanes MD  
 No Known Allergies Current Immunizations  Reviewed on 4/24/2017 No immunizations on file. Not reviewed this visit You Were Diagnosed With   
  
 Codes Comments Pure hypercholesterolemia    -  Primary ICD-10-CM: E78.00 ICD-9-CM: 272.0 Vitamin D deficiency     ICD-10-CM: E55.9 ICD-9-CM: 268.9 Vitals BP Pulse Temp Resp Height(growth percentile) Weight(growth percentile) 132/82 (BP 1 Location: Left arm, BP Patient Position: Sitting) 81 98.2 °F (36.8 °C) (Oral) 16 5' 6\" (1.676 m) 208 lb (94.3 kg) SpO2 BMI OB Status Smoking Status 96% 33.57 kg/m2 Postmenopausal Former Smoker Vitals History BMI and BSA Data Body Mass Index Body Surface Area  
 33.57 kg/m 2 2.1 m 2 Preferred Pharmacy Pharmacy Name Phone [facility] [address] [place] [phone] Your Updated Medication List  
  
   
This list is accurate as of: 6/26/17  9:22 AM.  Always use your most recent med list.  
  
  
  
  
 atorvastatin 20 mg tablet Commonly known as:  LIPITOR Take 1 Tab by mouth daily. Blood Pressure Monitor Kit Commonly known as:  BLOOD PRESSURE KIT For daily use. Dx: I99.8  
  
 coenzyme q10 10 mg Cap Take  by mouth.  
  
 ergocalciferol 50,000 unit capsule Commonly known as:  VITAMIN D2  
 Take 1 Cap by mouth every seven (7) days. lisinopril 5 mg tablet Commonly known as:  John Leys Take 1 Tab by mouth daily. Indications: hypertension  
  
 omeprazole 20 mg capsule Commonly known as:  PriLOSEC Take 1 Cap by mouth daily. To-Do List   
 06/26/2017 Lab:  HEPATIC FUNCTION PANEL   
  
 06/26/2017 Lab:  LIPID PANEL   
  
 06/26/2017 Lab:  METABOLIC PANEL, BASIC   
  
 06/26/2017 Lab:  VITAMIN D, 25 HYDROXY Patient Instructions High Cholesterol: Care Instructions Your Care Instructions Cholesterol is a type of fat in your blood. It is needed for many body functions, such as making new cells. Cholesterol is made by your body. It also comes from food you eat. High cholesterol means that you have too much of the fat in your blood. This raises your risk of a heart attack and stroke. LDL and HDL are part of your total cholesterol. LDL is the \"bad\" cholesterol. High LDL can raise your risk for heart disease, heart attack, and stroke. HDL is the \"good\" cholesterol. It helps clear bad cholesterol from the body. High HDL is linked with a lower risk of heart disease, heart attack, and stroke. Your cholesterol levels help your doctor find out your risk for having a heart attack or stroke. You and your doctor can talk about whether you need to lower your risk and what treatment is best for you. A heart-healthy lifestyle along with medicines can help lower your cholesterol and your risk. The way you choose to lower your risk will depend on how high your risk is for heart attack and stroke. It will also depend on how you feel about taking medicines. Follow-up care is a key part of your treatment and safety. Be sure to make and go to all appointments, and call your doctor if you are having problems. It's also a good idea to know your test results and keep a list of the medicines you take. How can you care for yourself at home? · Eat a variety of foods every day. Good choices include fruits, vegetables, whole grains (like oatmeal), dried beans and peas, nuts and seeds, soy products (like tofu), and fat-free or low-fat dairy products. · Replace butter, margarine, and hydrogenated or partially hydrogenated oils with olive and canola oils. (Canola oil margarine without trans fat is fine.) · Replace red meat with fish, poultry, and soy protein (like tofu). · Limit processed and packaged foods like chips, crackers, and cookies. · Bake, broil, or steam foods. Don't dias them. · Be physically active. Get at least 30 minutes of exercise on most days of the week. Walking is a good choice. You also may want to do other activities, such as running, swimming, cycling, or playing tennis or team sports. · Stay at a healthy weight or lose weight by making the changes in eating and physical activity listed above. Losing just a small amount of weight, even 5 to 10 pounds, can reduce your risk for having a heart attack or stroke. · Do not smoke. When should you call for help? Watch closely for changes in your health, and be sure to contact your doctor if: 
· You need help making lifestyle changes. · You have questions about your medicine. Where can you learn more? Go to http://stephanie-mariella.info/. Enter M483 in the search box to learn more about \"High Cholesterol: Care Instructions. \" Current as of: April 3, 2017 Content Version: 11.3 © 2483-2617 [employer]. Care instructions adapted under license by [employer] (which disclaims liability or warranty for this information). If you have questions about a medical condition or this instruction, always ask your healthcare professional. [address] any warranty or liability for your use of this information. Introducing [facility]!    
 [facility] introduces [employer] patient portal. Now you can access parts of your medical record, email your doctor's office, and request medication refills online. 1. In your internet browser, go to [email or web address] 2. Click on the First Time User? Click Here link in the Sign In box. You will see the New Member Sign Up page. 3. Enter your GoToTagst Access Code exactly as it appears below. You will not need to use this code after youve completed the sign-up process. If you do not sign up before the expiration date, you must request a new code. · IPX Access Code: Sharan James Expires: 7/23/2017  9:21 AM 
 
4. Enter the last four digits of your Social Security Number (xxxx) and Date of Birth (mm/dd/yyyy) as indicated and click Submit. You will be taken to the next sign-up page. 5. Create a IPX ID. This will be your IPX login ID and cannot be changed, so think of one that is secure and easy to remember. 6. Create a IPX password. You can change your password at any time. 7. Enter your Password Reset Question and Answer. This can be used at a later time if you forget your password. 8. Enter your e-mail address. You will receive e-mail notification when new information is available in [address] 9. Click Sign Up. You can now view and download portions of your medical record. 10. Click the Download Summary menu link to download a portable copy of your medical information. If you have questions, please visit the Frequently Asked Questions section of the IPX website. Remember, IPX is NOT to be used for urgent needs. For medical emergencies, dial 911. Now available from your iPhone and Android! Please provide this summary of care documentation to your next provider. Your primary care clinician is listed as Teo [address]. If you have any questions after today's visit, please call [phone].

## 2017-06-26 NOTE — PATIENT INSTRUCTIONS

## 2017-06-26 NOTE — PROGRESS NOTES
Mattie Nam is a 69 y.o. female here today for sore throat ,cough from Bp medication right leg,ankle pain     1. Have you been to the ER, urgent care clinic or hospitalized since your last visit? NO.     2. Have you seen or consulted any other health care providers outside of the [address] [place] [address] since your last visit (Include any pap smears or colon screening)? NO      Do you have an Advanced Directive? NO    Would you like information on Advanced Directives?  NO    Learning Assessment 10/2/2014   PRIMARY LEARNER Patient   HIGHEST LEVEL OF EDUCATION - PRIMARY LEARNER  4 YEARS OF COLLEGE   BARRIERS PRIMARY LEARNER NONE   PRIMARY LANGUAGE ENGLISH   LEARNER PREFERENCE PRIMARY LISTENING   ANSWERED BY pt   RELATIONSHIP OTHER

## 2017-06-26 NOTE — PROGRESS NOTES
Assessment/Plan:    Steffany Smith was seen today for hypertension, sore throat, cough and leg pain. Diagnoses and all orders for this visit:    Pure hypercholesterolemia  -     LIPID PANEL; Future  -     METABOLIC PANEL, BASIC; Future  -     HEPATIC FUNCTION PANEL; Future    Vitamin D deficiency  -     VITAMIN D, 25 HYDROXY; Future    Essential hypertension        F/u 3 months. The plan was discussed with the patient. The patient verbalized understanding and is in agreement with the plan. All medication potential side effects were discussed with the patient.    -------------------------------------------------------------------------------------------------------------------        Dilia Berman is a 69 y.o. female and presents with Hypertension; Sore Throat; Cough; and Leg Pain (right side )         Subjective:  Pt here for f/u. HTN: controlled. She has noted an issue of needing to clear her throat, particularly at night, some nights, not all night. She has noted an occasional cough, nothing consistent. I made her aware that her symptoms could be related to the BP med. She is tolerating in other ways so well that she is still reluctant to change it. HLD: due for repeat. Vit d has been low. Taking OTC 2000 units daily. Due for repeat. ROS:  Constitutional: No recent weight change. No weakness/fatigue. No f/c. Skin: No rashes, change in nails/hair, itching   HENT: No HA, dizziness. No hearing loss/tinnitus. No nasal congestion/discharge. Eyes: No change in vision, double/blurred vision or eye pain/redness. Cardiovascular: No CP/palpitations. No GUTIÉRREZ/orthopnea/PND. Respiratory: No cough/sputum, dyspnea, wheezing. Gastointestinal: No dysphagia, reflux. No n/v. No constipation/diarrhea. No melena/rectal bleeding. Genitourinary: No dysuria, urinary hesitancy, nocturia, hematuria. No incontinence. Musculoskeletal: No joint pain/stiffness. No muscle pain/tenderness.    Endo: No heat/cold intolerance, no polyuria/polydypsia. Heme: No h/o anemia. No easy bleeding/bruising. Allergy/Immunology: No seasonal rhinitis. Denies frequent colds, sinus/ear infections. Neurological: No seizures/numbness/weakness. No paresthesias. Psychiatric:  No depression, anxiety. The problem list was updated as a part of today's visit. Patient Active Problem List   Diagnosis Code    HLD (hyperlipidemia) E78.5    GERD (gastroesophageal reflux disease) K21.9    Sciatica of left side M54.32    Osteoarthritis of both knees M17.0    Vitamin D deficiency E55.9    Advance directive discussed with patient Z70.80    Chronic left-sided low back pain with sciatica M54.40, G89.29    Essential hypertension I10       The PSH, FH were reviewed. SH:  Social History   Substance Use Topics    Smoking status: Former Smoker     Packs/day: 1.00     Quit date: 8/1/1993    Smokeless tobacco: Never Used    Alcohol use Yes      Comment: once a month, 1 mixed drink       Medications/Allergies:  Current Outpatient Prescriptions on File Prior to Visit   Medication Sig Dispense Refill    lisinopril (PRINIVIL, ZESTRIL) 5 mg tablet Take 1 Tab by mouth daily. Indications: hypertension 90 Tab 1    Blood Pressure Monitor (BLOOD PRESSURE KIT) kit For daily use. Dx: I99.8 1 Kit 0    atorvastatin (LIPITOR) 20 mg tablet Take 1 Tab by mouth daily. 90 Tab 2    coenzyme q10 10 mg cap Take  by mouth.  omeprazole (PRILOSEC) 20 mg capsule Take 1 Cap by mouth daily. 90 Cap 3    ergocalciferol (VITAMIN D2) 50,000 unit capsule Take 1 Cap by mouth every seven (7) days. 12 Cap 3     No current facility-administered medications on file prior to visit.          No Known Allergies      Health Maintenance:   Health Maintenance   Topic Date Due    INFLUENZA AGE 9 TO ADULT  08/01/2017    Pneumococcal 65+ Low/Medium Risk (2 of 2 - PPSV23) 10/13/2017    MEDICARE YEARLY EXAM  02/18/2018    GLAUCOMA SCREENING Q2Y  01/12/2019  BREAST CANCER SCRN MAMMOGRAM  04/10/2019    COLONOSCOPY  12/31/2024    DTaP/Tdap/Td series (2 - Td) 10/13/2026    Hepatitis C Screening  Completed    OSTEOPOROSIS SCREENING (DEXA)  Completed    ZOSTER VACCINE AGE 60>  Completed       Objective:  Visit Vitals    /82 (BP 1 Location: Left arm, BP Patient Position: Sitting)    Pulse 81    Temp 98.2 °F (36.8 °C) (Oral)    Resp 16    Ht 5' 6\" (1.676 m)    Wt 208 lb (94.3 kg)    SpO2 96%    BMI 33.57 kg/m2          Nurses notes and VS reviewed. Physical Examination: General appearance - alert, well appearing, and in no distress  Chest - clear to auscultation, no wheezes, rales or rhonchi, symmetric air entry  Heart - normal rate, regular rhythm, normal S1, S2, no murmurs, rubs, clicks or gallops        Labwork and Ancillary Studies:    CBC w/Diff  Lab Results   Component Value Date/Time    WBC 4.2 10/04/2016 07:31 AM    HGB 13.9 10/04/2016 07:31 AM    PLATELET 931 03/39/9373 07:31 AM         Basic Metabolic Profile  Lab Results   Component Value Date/Time    Sodium 145 07/11/2016 09:47 AM    Potassium 4.6 07/11/2016 09:47 AM    Chloride 102 07/11/2016 09:47 AM    CO2 27 07/11/2016 09:47 AM    Anion gap 7 03/08/2016 12:00 PM    Glucose 89 07/11/2016 09:47 AM    BUN 12 07/11/2016 09:47 AM    Creatinine 0.68 07/11/2016 09:47 AM    BUN/Creatinine ratio 18 07/11/2016 09:47 AM    GFR est  07/11/2016 09:47 AM    GFR est non-AA 90 07/11/2016 09:47 AM    Calcium 9.5 07/11/2016 09:47 AM         LFT  Lab Results   Component Value Date/Time    ALT (SGPT) 14 10/04/2016 07:31 AM    AST (SGOT) 15 10/04/2016 07:31 AM    Alk.  phosphatase 78 10/04/2016 07:31 AM    Bilirubin, direct 0.12 10/04/2016 07:31 AM    Bilirubin, total 0.4 10/04/2016 07:31 AM         Cholesterol  Lab Results   Component Value Date/Time    Cholesterol, total 189 10/04/2016 07:31 AM    HDL Cholesterol 75 10/04/2016 07:31 AM    LDL, calculated 99 10/04/2016 07:31 AM    Triglyceride 74 10/04/2016 07:31 AM    CHOL/HDL Ratio 3.8 04/04/2016 08:32 AM

## 2017-06-29 ENCOUNTER — HOSPITAL ENCOUNTER (OUTPATIENT)
Dept: LAB | Age: 67
Discharge: HOME | End: 2017-06-29

## 2017-06-29 PROCEDURE — 99001 SPECIMEN HANDLING PT-LAB: CPT | Performed by: INTERNAL MEDICINE

## 2017-06-30 LAB
25(OH)D3+25(OH)D2 SERPL-MCNC: 25.3 NG/ML (ref 30–100)
ALBUMIN SERPL-MCNC: 3.9 G/DL (ref 3.6–4.8)
ALP SERPL-CCNC: 78 IU/L (ref 39–117)
ALT SERPL-CCNC: 11 IU/L (ref 0–32)
AST SERPL-CCNC: 14 IU/L (ref 0–40)
BILIRUB DIRECT SERPL-MCNC: 0.09 MG/DL (ref 0–0.4)
BILIRUB SERPL-MCNC: 0.3 MG/DL (ref 0–1.2)
BUN SERPL-MCNC: 8 MG/DL (ref 8–27)
BUN/CREAT SERPL: 11 (ref 12–28)
CALCIUM SERPL-MCNC: 9.4 MG/DL (ref 8.7–10.3)
CHLORIDE SERPL-SCNC: 104 MMOL/L (ref 96–106)
CHOLEST SERPL-MCNC: 222 MG/DL (ref 100–199)
CO2 SERPL-SCNC: 23 MMOL/L (ref 18–29)
CREAT SERPL-MCNC: 0.7 MG/DL (ref 0.57–1)
GLUCOSE SERPL-MCNC: 89 MG/DL (ref 65–99)
HDLC SERPL-MCNC: 89 MG/DL
INTERPRETATION, 910389: NORMAL
LDLC SERPL CALC-MCNC: 119 MG/DL (ref 0–99)
POTASSIUM SERPL-SCNC: 4.4 MMOL/L (ref 3.5–5.2)
PROT SERPL-MCNC: 6.9 G/DL (ref 6–8.5)
SODIUM SERPL-SCNC: 146 MMOL/L (ref 134–144)
TRIGL SERPL-MCNC: 69 MG/DL (ref 0–149)
VLDLC SERPL CALC-MCNC: 14 MG/DL (ref 5–40)

## 2017-06-30 NOTE — PROGRESS NOTES
Her cholesterol has risen! We need to increase her Lipitor to 40 mg. Vit D is still low. Start taking Vit D 2000 units daily OTC.

## 2017-10-31 ENCOUNTER — TELEPHONE (OUTPATIENT)
Dept: FAMILY MEDICINE CLINIC | Age: 67
End: 2017-10-31

## 2017-10-31 DIAGNOSIS — E55.9 VITAMIN D DEFICIENCY: ICD-10-CM

## 2017-10-31 DIAGNOSIS — E78.00 PURE HYPERCHOLESTEROLEMIA: Primary | ICD-10-CM

## 2017-10-31 NOTE — TELEPHONE ENCOUNTER
Pt is requesting routine labs. Pt states she specifically thinks she is due for a lipid panel. Please review and order accordingly.

## 2017-11-03 ENCOUNTER — HOSPITAL ENCOUNTER (OUTPATIENT)
Dept: LAB | Age: 67
Discharge: HOME | End: 2017-11-03

## 2017-11-03 PROCEDURE — 99001 SPECIMEN HANDLING PT-LAB: CPT | Performed by: INTERNAL MEDICINE

## 2017-11-04 LAB
25(OH)D3+25(OH)D2 SERPL-MCNC: 24 NG/ML (ref 30–100)
ALBUMIN SERPL-MCNC: 4.3 G/DL (ref 3.6–4.8)
ALP SERPL-CCNC: 89 IU/L (ref 39–117)
ALT SERPL-CCNC: 12 IU/L (ref 0–32)
AST SERPL-CCNC: 15 IU/L (ref 0–40)
BILIRUB DIRECT SERPL-MCNC: 0.14 MG/DL (ref 0–0.4)
BILIRUB SERPL-MCNC: 0.4 MG/DL (ref 0–1.2)
BUN SERPL-MCNC: 9 MG/DL (ref 8–27)
BUN/CREAT SERPL: 11 (ref 12–28)
CALCIUM SERPL-MCNC: 10 MG/DL (ref 8.7–10.3)
CHLORIDE SERPL-SCNC: 105 MMOL/L (ref 96–106)
CHOLEST SERPL-MCNC: 197 MG/DL (ref 100–199)
CO2 SERPL-SCNC: 26 MMOL/L (ref 18–29)
CREAT SERPL-MCNC: 0.83 MG/DL (ref 0.57–1)
GFR SERPLBLD CREATININE-BSD FMLA CKD-EPI: 72 ML/MIN/1.73
GFR SERPLBLD CREATININE-BSD FMLA CKD-EPI: 83 ML/MIN/1.73
GLUCOSE SERPL-MCNC: 96 MG/DL (ref 65–99)
HDLC SERPL-MCNC: 77 MG/DL
INTERPRETATION, 910389: NORMAL
LDLC SERPL CALC-MCNC: 104 MG/DL (ref 0–99)
POTASSIUM SERPL-SCNC: 4.8 MMOL/L (ref 3.5–5.2)
PROT SERPL-MCNC: 7.1 G/DL (ref 6–8.5)
SODIUM SERPL-SCNC: 146 MMOL/L (ref 134–144)
TRIGL SERPL-MCNC: 78 MG/DL (ref 0–149)
VLDLC SERPL CALC-MCNC: 16 MG/DL (ref 5–40)

## 2017-11-14 ENCOUNTER — OFFICE VISIT (OUTPATIENT)
Dept: FAMILY MEDICINE CLINIC | Age: 67
End: 2017-11-14

## 2017-11-14 VITALS
DIASTOLIC BLOOD PRESSURE: 86 MMHG | WEIGHT: 210 LBS | OXYGEN SATURATION: 99 % | BODY MASS INDEX: 33.75 KG/M2 | TEMPERATURE: 98.2 F | HEIGHT: 66 IN | HEART RATE: 77 BPM | SYSTOLIC BLOOD PRESSURE: 136 MMHG | RESPIRATION RATE: 20 BRPM

## 2017-11-14 DIAGNOSIS — E55.9 VITAMIN D DEFICIENCY: ICD-10-CM

## 2017-11-14 DIAGNOSIS — E78.00 PURE HYPERCHOLESTEROLEMIA: Primary | ICD-10-CM

## 2017-11-14 DIAGNOSIS — I10 ESSENTIAL HYPERTENSION: ICD-10-CM

## 2017-11-14 NOTE — PATIENT INSTRUCTIONS
High Cholesterol: Care Instructions  Your Care Instructions    Cholesterol is a type of fat in your blood. It is needed for many body functions, such as making new cells. Cholesterol is made by your body. It also comes from food you eat. High cholesterol means that you have too much of the fat in your blood. This raises your risk of a heart attack and stroke. LDL and HDL are part of your total cholesterol. LDL is the \"bad\" cholesterol. High LDL can raise your risk for heart disease, heart attack, and stroke. HDL is the \"good\" cholesterol. It helps clear bad cholesterol from the body. High HDL is linked with a lower risk of heart disease, heart attack, and stroke. Your cholesterol levels help your doctor find out your risk for having a heart attack or stroke. You and your doctor can talk about whether you need to lower your risk and what treatment is best for you. A heart-healthy lifestyle along with medicines can help lower your cholesterol and your risk. The way you choose to lower your risk will depend on how high your risk is for heart attack and stroke. It will also depend on how you feel about taking medicines. Follow-up care is a key part of your treatment and safety. Be sure to make and go to all appointments, and call your doctor if you are having problems. It's also a good idea to know your test results and keep a list of the medicines you take. How can you care for yourself at home? · Eat a variety of foods every day. Good choices include fruits, vegetables, whole grains (like oatmeal), dried beans and peas, nuts and seeds, soy products (like tofu), and fat-free or low-fat dairy products. · Replace butter, margarine, and hydrogenated or partially hydrogenated oils with olive and canola oils. (Canola oil margarine without trans fat is fine.)  · Replace red meat with fish, poultry, and soy protein (like tofu). · Limit processed and packaged foods like chips, crackers, and cookies.   · Bake, broil, or steam foods. Don't dias them. · Be physically active. Get at least 30 minutes of exercise on most days of the week. Walking is a good choice. You also may want to do other activities, such as running, swimming, cycling, or playing tennis or team sports. · Stay at a healthy weight or lose weight by making the changes in eating and physical activity listed above. Losing just a small amount of weight, even 5 to 10 pounds, can reduce your risk for having a heart attack or stroke. · Do not smoke. When should you call for help? Watch closely for changes in your health, and be sure to contact your doctor if:  ? · You need help making lifestyle changes. ? · You have questions about your medicine. Where can you learn more? Go to http://stephanie-mariella.info/. Enter Y247 in the search box to learn more about \"High Cholesterol: Care Instructions. \"  Current as of: September 21, 2016  Content Version: 11.4  © 5713-6197 [employer]. Care instructions adapted under license by [employer] (which disclaims liability or warranty for this information). If you have questions about a medical condition or this instruction, always ask your healthcare professional. [place] [address] any warranty or liability for your use of this information.

## 2017-11-14 NOTE — PROGRESS NOTES
Renata Atkinson is a 69 y.o. female is here for BP and cholesterol. 1. Have you been to the ER, urgent care clinic since your last visit? Hospitalized since your last visit? No    2. Have you seen or consulted any other health care providers outside of the [address] [place] [address] since your last visit? Include any pap smears or colon screening. No     Health Maintenance Due   Topic Date Due    Influenza Age 5 to Adult  08/01/2017    Pneumococcal 65+ Low/Medium Risk (2 of 2 - PPSV23) 10/13/2017     States already did her flu shot.

## 2017-11-14 NOTE — PROGRESS NOTES
Assessment/Plan:    *Diagnoses and all orders for this visit:    1. Pure hypercholesterolemia  -     CBC WITH AUTOMATED DIFF; Future  -     HEPATIC FUNCTION PANEL; Future  -     LIPID PANEL; Future  -     METABOLIC PANEL, BASIC; Future  -     TSH 3RD GENERATION; Future  -     T4, FREE; Future  -     URINALYSIS W/ RFLX MICROSCOPIC; Future    2. Essential hypertension    3. Vitamin D deficiency  -     VITAMIN D, 25 HYDROXY; Future        Pt doing so much better. Will increase Vit D to 4000 units daily. Physical in March 2018. BW. Will get Debrox OTC for ears. The plan was discussed with the patient. The patient verbalized understanding and is in agreement with the plan. All medication potential side effects were discussed with the patient.    -------------------------------------------------------------------------------------------------------------------        Gene Shipley is a 69 y.o. female and presents with Blood Pressure Check and Cholesterol Problem         Subjective:  Pt here for f/u. HLD: pt has been doing better. Has been using CoQ 10 daily and this has helped her to tolerate her statin. HTN: well controlled. Vit D is still low. taking OTC 2000 units daily. RT ear has been feeling itchy, no pain. ROS:  Constitutional: No recent weight change. No weakness/fatigue. No f/c. Skin: No rashes, change in nails/hair, itching   HENT: No HA, dizziness. No hearing loss/tinnitus. No nasal congestion/discharge. Eyes: No change in vision, double/blurred vision or eye pain/redness. Cardiovascular: No CP/palpitations. No GUTIÉRREZ/orthopnea/PND. Respiratory: No cough/sputum, dyspnea, wheezing. Gastointestinal: No dysphagia, reflux. No n/v. No constipation/diarrhea. No melena/rectal bleeding. Genitourinary: No dysuria, urinary hesitancy, nocturia, hematuria. No incontinence. Musculoskeletal: No joint pain/stiffness. No muscle pain/tenderness.    Endo: No heat/cold intolerance, no polyuria/polydypsia. Heme: No h/o anemia. No easy bleeding/bruising. Allergy/Immunology: No seasonal rhinitis. Denies frequent colds, sinus/ear infections. Neurological: No seizures/numbness/weakness. No paresthesias. Psychiatric:  No depression, anxiety. The problem list was updated as a part of today's visit. Patient Active Problem List   Diagnosis Code    HLD (hyperlipidemia) E78.5    GERD (gastroesophageal reflux disease) K21.9    Sciatica of left side M54.32    Osteoarthritis of both knees M17.0    Vitamin D deficiency E55.9    Advance directive discussed with patient Z70.80    Chronic left-sided low back pain with sciatica M54.40, G89.29    Essential hypertension I10       The PSH, FH were reviewed. SH:  Social History   Substance Use Topics    Smoking status: Former Smoker     Packs/day: 1.00     Quit date: 8/1/1993    Smokeless tobacco: Never Used    Alcohol use Yes      Comment: once a month, 1 mixed drink       Medications/Allergies:  Current Outpatient Prescriptions on File Prior to Visit   Medication Sig Dispense Refill    lisinopril (PRINIVIL, ZESTRIL) 5 mg tablet Take 1 Tab by mouth daily. Indications: hypertension 90 Tab 1    Blood Pressure Monitor (BLOOD PRESSURE KIT) kit For daily use. Dx: I99.8 1 Kit 0    atorvastatin (LIPITOR) 20 mg tablet Take 1 Tab by mouth daily. 90 Tab 2    coenzyme q10 10 mg cap Take  by mouth.  omeprazole (PRILOSEC) 20 mg capsule Take 1 Cap by mouth daily. 90 Cap 3    ergocalciferol (VITAMIN D2) 50,000 unit capsule Take 1 Cap by mouth every seven (7) days. 12 Cap 3     No current facility-administered medications on file prior to visit.          No Known Allergies      Health Maintenance:   Health Maintenance   Topic Date Due    Pneumococcal 65+ Low/Medium Risk (2 of 2 - PPSV23) 10/13/2017    MEDICARE YEARLY EXAM  02/18/2018    GLAUCOMA SCREENING Q2Y  01/12/2019    BREAST CANCER SCRN MAMMOGRAM  04/10/2019    COLONOSCOPY  12/31/2024    DTaP/Tdap/Td series (2 - Td) 10/13/2026    Hepatitis C Screening  Completed    OSTEOPOROSIS SCREENING (DEXA)  Completed    ZOSTER VACCINE AGE 60>  Completed    Influenza Age 5 to Adult  Addressed       Objective:  Visit Vitals    /86 (BP 1 Location: Right arm, BP Patient Position: Sitting)    Pulse 77    Temp 98.2 °F (36.8 °C) (Oral)    Resp 20    Ht 5' 6\" (1.676 m)    Wt 210 lb (95.3 kg)    SpO2 99%    BMI 33.89 kg/m2          Nurses notes and VS reviewed. Physical Examination: General appearance - alert, well appearing, and in no distress  Ears - ceruminosis noted, mild but L>R. Chest - clear to auscultation, no wheezes, rales or rhonchi, symmetric air entry  Heart - normal rate, regular rhythm, normal S1, S2, no murmurs, rubs, clicks or gallops        Labwork and Ancillary Studies:    CBC w/Diff  Lab Results   Component Value Date/Time    WBC 4.2 10/04/2016 07:31 AM    HGB 13.9 10/04/2016 07:31 AM    PLATELET 176 90/97/3872 07:31 AM         Basic Metabolic Profile  Lab Results   Component Value Date/Time    Sodium 146 11/03/2017 07:14 AM    Potassium 4.8 11/03/2017 07:14 AM    Chloride 105 11/03/2017 07:14 AM    CO2 26 11/03/2017 07:14 AM    Anion gap 7 03/08/2016 12:00 PM    Glucose 96 11/03/2017 07:14 AM    BUN 9 11/03/2017 07:14 AM    Creatinine 0.83 11/03/2017 07:14 AM    BUN/Creatinine ratio 11 11/03/2017 07:14 AM    GFR est AA 83 11/03/2017 07:14 AM    GFR est non-AA 72 11/03/2017 07:14 AM    Calcium 10.0 11/03/2017 07:14 AM         LFT  Lab Results   Component Value Date/Time    ALT (SGPT) 12 11/03/2017 07:14 AM    AST (SGOT) 15 11/03/2017 07:14 AM    Alk.  phosphatase 89 11/03/2017 07:14 AM    Bilirubin, direct 0.14 11/03/2017 07:14 AM    Bilirubin, total 0.4 11/03/2017 07:14 AM         Cholesterol  Lab Results   Component Value Date/Time    Cholesterol, total 197 11/03/2017 07:14 AM    HDL Cholesterol 77 11/03/2017 07:14 AM    LDL, calculated 104 11/03/2017 07:14 AM    Triglyceride 78 11/03/2017 07:14 AM    CHOL/HDL Ratio 3.8 04/04/2016 08:32 AM

## 2017-11-14 NOTE — MR AVS SNAPSHOT
Visit Information Date & Time Provider Department Dept. Phone Encounter #  
 11/14/2017 11:00 AM Alivia BelenDeja  [phone] [id number] Upcoming Health Maintenance Date Due Pneumococcal 65+ Low/Medium Risk (2 of 2 - PPSV23) 10/13/2017 MEDICARE YEARLY EXAM 2/18/2018 GLAUCOMA SCREENING Q2Y 1/12/2019 BREAST CANCER SCRN MAMMOGRAM 4/10/2019 COLONOSCOPY 12/31/2024 DTaP/Tdap/Td series (2 - Td) 10/13/2026 Allergies as of 11/14/2017  Review Complete On: 11/14/2017 By: Alivia Glover MD  
 No Known Allergies Current Immunizations  Reviewed on 11/14/2017 Name Date Influenza High Dose Vaccine PF 10/5/2017 Reviewed by Alivia Glover MD on 11/14/2017 at 11:16 AM  
 Reviewed by Alivia Glover MD on 11/14/2017 at 11:16 AM  
You Were Diagnosed With   
  
 Codes Comments Pure hypercholesterolemia    -  Primary ICD-10-CM: E78.00 ICD-9-CM: 272.0 Essential hypertension     ICD-10-CM: I10 
ICD-9-CM: 401.9 Vitamin D deficiency     ICD-10-CM: E55.9 ICD-9-CM: 268.9 Vitals BP Pulse Temp Resp Height(growth percentile) Weight(growth percentile) 136/86 (BP 1 Location: Right arm, BP Patient Position: Sitting) 77 98.2 °F (36.8 °C) (Oral) 20 5' 6\" (1.676 m) 210 lb (95.3 kg) SpO2 BMI OB Status Smoking Status 99% 33.89 kg/m2 Postmenopausal Former Smoker BMI and BSA Data Body Mass Index Body Surface Area  
 33.89 kg/m 2 2.11 m 2 Preferred Pharmacy Pharmacy Name Phone [facility], [address] [place] [phone] Your Updated Medication List  
  
   
This list is accurate as of: 11/14/17 11:25 AM.  Always use your most recent med list.  
  
  
  
  
 atorvastatin 20 mg tablet Commonly known as:  LIPITOR Take 1 Tab by mouth daily. Blood Pressure Monitor Kit Commonly known as:  BLOOD PRESSURE KIT For daily use. Dx: I99.8  
  
 coenzyme q10 10 mg Cap Take  by mouth.  
  
 ergocalciferol 50,000 unit capsule Commonly known as:  VITAMIN D2 Take 1 Cap by mouth every seven (7) days. lisinopril 5 mg tablet Commonly known as:  Esvin Economy Take 1 Tab by mouth daily. Indications: hypertension  
  
 omeprazole 20 mg capsule Commonly known as:  PriLOSEC Take 1 Cap by mouth daily. Patient Instructions High Cholesterol: Care Instructions Your Care Instructions Cholesterol is a type of fat in your blood. It is needed for many body functions, such as making new cells. Cholesterol is made by your body. It also comes from food you eat. High cholesterol means that you have too much of the fat in your blood. This raises your risk of a heart attack and stroke. LDL and HDL are part of your total cholesterol. LDL is the \"bad\" cholesterol. High LDL can raise your risk for heart disease, heart attack, and stroke. HDL is the \"good\" cholesterol. It helps clear bad cholesterol from the body. High HDL is linked with a lower risk of heart disease, heart attack, and stroke. Your cholesterol levels help your doctor find out your risk for having a heart attack or stroke. You and your doctor can talk about whether you need to lower your risk and what treatment is best for you. A heart-healthy lifestyle along with medicines can help lower your cholesterol and your risk. The way you choose to lower your risk will depend on how high your risk is for heart attack and stroke. It will also depend on how you feel about taking medicines. Follow-up care is a key part of your treatment and safety. Be sure to make and go to all appointments, and call your doctor if you are having problems. It's also a good idea to know your test results and keep a list of the medicines you take. How can you care for yourself at home? · Eat a variety of foods every day.  Good choices include fruits, vegetables, whole grains (like oatmeal), dried beans and peas, nuts and seeds, soy products (like tofu), and fat-free or low-fat dairy products. · Replace butter, margarine, and hydrogenated or partially hydrogenated oils with olive and canola oils. (Canola oil margarine without trans fat is fine.) · Replace red meat with fish, poultry, and soy protein (like tofu). · Limit processed and packaged foods like chips, crackers, and cookies. · Bake, broil, or steam foods. Don't dias them. · Be physically active. Get at least 30 minutes of exercise on most days of the week. Walking is a good choice. You also may want to do other activities, such as running, swimming, cycling, or playing tennis or team sports. · Stay at a healthy weight or lose weight by making the changes in eating and physical activity listed above. Losing just a small amount of weight, even 5 to 10 pounds, can reduce your risk for having a heart attack or stroke. · Do not smoke. When should you call for help? Watch closely for changes in your health, and be sure to contact your doctor if: 
? · You need help making lifestyle changes. ? · You have questions about your medicine. Where can you learn more? Go to http://stephanie-mariella.info/. Enter J953 in the search box to learn more about \"High Cholesterol: Care Instructions. \" Current as of: September 21, 2016 Content Version: 11.4 © 0633-5389 [employer]. Care instructions adapted under license by "[employer] (which disclaims liability or warranty for this information). If you have questions about a medical condition or this instruction, always ask your healthcare professional. [place] [address] any warranty or liability for your use of this information. Introducing hospitals & HEALTH SERVICES!    
 [facility] introduces [employer] patient portal. Now you can access parts of your medical record, email your doctor's office, and request medication refills online. 1. In your internet browser, go to [email or web address] 2. Click on the First Time User? Click Here link in the Sign In box. You will see the New Member Sign Up page. 3. Enter your [employer] Access Code exactly as it appears below. You will not need to use this code after youve completed the sign-up process. If you do not sign up before the expiration date, you must request a new code. · [employer] Access Code: [email or web address] Expires: 2/12/2018 11:24 AM 
 
4. Enter the last four digits of your Social Security Number (xxxx) and Date of Birth (mm/dd/yyyy) as indicated and click Submit. You will be taken to the next sign-up page. 5. Create a [employer] ID. This will be your [employer] login ID and cannot be changed, so think of one that is secure and easy to remember. 6. Create a [employer] password. You can change your password at any time. 7. Enter your Password Reset Question and Answer. This can be used at a later time if you forget your password. 8. Enter your e-mail address. You will receive e-mail notification when new information is available in [address] 9. Click Sign Up. You can now view and download portions of your medical record. 10. Click the Download Summary menu link to download a portable copy of your medical information. If you have questions, please visit the Frequently Asked Questions section of the [employer] website. Remember, [employer] is NOT to be used for urgent needs. For medical emergencies, dial 911. Now available from your iPhone and Android! Please provide this summary of care documentation to your next provider. Your primary care clinician is listed as Teo [facility]. If you have any questions after today's visit, please call [phone].

## 2018-02-15 DIAGNOSIS — E78.2 MIXED HYPERLIPIDEMIA: ICD-10-CM

## 2018-02-15 RX ORDER — ATORVASTATIN CALCIUM 20 MG/1
20 TABLET, FILM COATED ORAL DAILY
Qty: 90 TAB | Refills: 2 | Status: SHIPPED | OUTPATIENT
Start: 2018-02-15 | End: 2019-03-06 | Stop reason: SDUPTHER

## 2018-02-15 NOTE — TELEPHONE ENCOUNTER
Patient pharmacy is requesting a med refill of atorvastatin 20 mg    Last visit: 11/14/17    Last refill: 1/6/17

## 2018-03-09 ENCOUNTER — HOSPITAL ENCOUNTER (OUTPATIENT)
Dept: LAB | Age: 68
Discharge: HOME | End: 2018-03-09

## 2018-03-09 PROCEDURE — 99001 SPECIMEN HANDLING PT-LAB: CPT | Performed by: INTERNAL MEDICINE

## 2018-03-10 LAB
ALBUMIN SERPL-MCNC: 4 G/DL (ref 3.6–4.8)
ALP SERPL-CCNC: 78 IU/L (ref 39–117)
ALT SERPL-CCNC: 12 IU/L (ref 0–32)
APPEARANCE UR: CLEAR
AST SERPL-CCNC: 15 IU/L (ref 0–40)
BASOPHILS # BLD AUTO: 0.1 X10E3/UL (ref 0–0.2)
BASOPHILS NFR BLD AUTO: 1 %
BILIRUB DIRECT SERPL-MCNC: 0.17 MG/DL (ref 0–0.4)
BILIRUB SERPL-MCNC: 0.7 MG/DL (ref 0–1.2)
BILIRUB UR QL STRIP: NEGATIVE
BUN SERPL-MCNC: 8 MG/DL (ref 8–27)
BUN/CREAT SERPL: 11 (ref 12–28)
CALCIUM SERPL-MCNC: 9.4 MG/DL (ref 8.7–10.3)
CHLORIDE SERPL-SCNC: 102 MMOL/L (ref 96–106)
CHOLEST SERPL-MCNC: 198 MG/DL (ref 100–199)
CO2 SERPL-SCNC: 24 MMOL/L (ref 18–29)
COLOR UR: YELLOW
CREAT SERPL-MCNC: 0.7 MG/DL (ref 0.57–1)
EOSINOPHIL # BLD AUTO: 0.1 X10E3/UL (ref 0–0.4)
EOSINOPHIL NFR BLD AUTO: 2 %
ERYTHROCYTE [DISTWIDTH] IN BLOOD BY AUTOMATED COUNT: 13.9 % (ref 12.3–15.4)
GFR SERPLBLD CREATININE-BSD FMLA CKD-EPI: 102 ML/MIN/1.73
GFR SERPLBLD CREATININE-BSD FMLA CKD-EPI: 89 ML/MIN/1.73
GLUCOSE SERPL-MCNC: 89 MG/DL (ref 65–99)
GLUCOSE UR QL: NEGATIVE
HCT VFR BLD AUTO: 40 % (ref 34–46.6)
HDLC SERPL-MCNC: 72 MG/DL
HGB BLD-MCNC: 13.4 G/DL (ref 11.1–15.9)
HGB UR QL STRIP: NEGATIVE
IMM GRANULOCYTES # BLD: 0 X10E3/UL (ref 0–0.1)
IMM GRANULOCYTES NFR BLD: 0 %
INTERPRETATION, 910389: NORMAL
KETONES UR QL STRIP: NEGATIVE
LDLC SERPL CALC-MCNC: 111 MG/DL (ref 0–99)
LEUKOCYTE ESTERASE UR QL STRIP: NEGATIVE
LYMPHOCYTES # BLD AUTO: 1.7 X10E3/UL (ref 0.7–3.1)
LYMPHOCYTES NFR BLD AUTO: 32 %
MCH RBC QN AUTO: 26.7 PG (ref 26.6–33)
MCHC RBC AUTO-ENTMCNC: 33.5 G/DL (ref 31.5–35.7)
MCV RBC AUTO: 80 FL (ref 79–97)
MICRO URNS: NORMAL
MONOCYTES # BLD AUTO: 0.5 X10E3/UL (ref 0.1–0.9)
MONOCYTES NFR BLD AUTO: 10 %
NEUTROPHILS # BLD AUTO: 2.9 X10E3/UL (ref 1.4–7)
NEUTROPHILS NFR BLD AUTO: 55 %
NITRITE UR QL STRIP: NEGATIVE
PH UR STRIP: 7 [PH] (ref 5–7.5)
PLATELET # BLD AUTO: 386 X10E3/UL (ref 150–379)
POTASSIUM SERPL-SCNC: 4.2 MMOL/L (ref 3.5–5.2)
PROT SERPL-MCNC: 6.9 G/DL (ref 6–8.5)
PROT UR QL STRIP: NEGATIVE
RBC # BLD AUTO: 5.01 X10E6/UL (ref 3.77–5.28)
SODIUM SERPL-SCNC: 143 MMOL/L (ref 134–144)
SP GR UR: 1.01 (ref 1–1.03)
T4 FREE SERPL-MCNC: 1.36 NG/DL (ref 0.82–1.77)
TRIGL SERPL-MCNC: 75 MG/DL (ref 0–149)
TSH SERPL DL<=0.005 MIU/L-ACNC: 1.17 UIU/ML (ref 0.45–4.5)
UROBILINOGEN UR STRIP-MCNC: 1 MG/DL (ref 0.2–1)
VLDLC SERPL CALC-MCNC: 15 MG/DL (ref 5–40)
WBC # BLD AUTO: 5.3 X10E3/UL (ref 3.4–10.8)

## 2018-03-21 ENCOUNTER — OFFICE VISIT (OUTPATIENT)
Dept: FAMILY MEDICINE CLINIC | Age: 68
End: 2018-03-21

## 2018-03-21 VITALS
BODY MASS INDEX: 32.65 KG/M2 | SYSTOLIC BLOOD PRESSURE: 148 MMHG | WEIGHT: 208 LBS | HEART RATE: 73 BPM | DIASTOLIC BLOOD PRESSURE: 92 MMHG | OXYGEN SATURATION: 98 % | HEIGHT: 67 IN | TEMPERATURE: 97.8 F | RESPIRATION RATE: 19 BRPM

## 2018-03-21 DIAGNOSIS — I10 ESSENTIAL HYPERTENSION: ICD-10-CM

## 2018-03-21 DIAGNOSIS — K21.9 GASTROESOPHAGEAL REFLUX DISEASE WITHOUT ESOPHAGITIS: ICD-10-CM

## 2018-03-21 DIAGNOSIS — Z00.00 MEDICARE ANNUAL WELLNESS VISIT, SUBSEQUENT: ICD-10-CM

## 2018-03-21 DIAGNOSIS — E55.9 VITAMIN D DEFICIENCY: ICD-10-CM

## 2018-03-21 DIAGNOSIS — E78.00 PURE HYPERCHOLESTEROLEMIA: Primary | ICD-10-CM

## 2018-03-21 DIAGNOSIS — Z13.6 SCREENING FOR ISCHEMIC HEART DISEASE: ICD-10-CM

## 2018-03-21 DIAGNOSIS — Z13.1 SCREENING FOR DIABETES MELLITUS: ICD-10-CM

## 2018-03-21 DIAGNOSIS — Z13.39 SCREENING FOR ALCOHOLISM: ICD-10-CM

## 2018-03-21 RX ORDER — GLUCOSAMINE SULFATE 1500 MG
4000 POWDER IN PACKET (EA) ORAL DAILY
COMMUNITY

## 2018-03-21 RX ORDER — OMEPRAZOLE 20 MG/1
20 CAPSULE, DELAYED RELEASE ORAL DAILY
Qty: 90 CAP | Refills: 2 | Status: SHIPPED | OUTPATIENT
Start: 2018-03-21 | End: 2019-04-11 | Stop reason: SDUPTHER

## 2018-03-21 NOTE — PROGRESS NOTES
Marsha Washington is a 69 y.o. female (: 1950) presenting to address:    No chief complaint on file. Vitals:    18 0747   Resp: 19   Weight: 208 lb (94.3 kg)   Height: 5' 6.5\" (1.689 m)   PainSc:   0 - No pain       Hearing/Vision:   No exam data present    Learning Assessment:     Learning Assessment 10/2/2014   PRIMARY LEARNER Patient   HIGHEST LEVEL OF EDUCATION - PRIMARY LEARNER  4 YEARS OF COLLEGE   BARRIERS PRIMARY LEARNER NONE   PRIMARY LANGUAGE ENGLISH   LEARNER PREFERENCE PRIMARY LISTENING   ANSWERED BY pt   RELATIONSHIP OTHER     Depression Screening:     PHQ over the last two weeks 2017   Little interest or pleasure in doing things Several days   Feeling down, depressed or hopeless Several days   Total Score PHQ 2 2     Fall Risk Assessment:     Fall Risk Assessment, last 12 mths 2017   Able to walk? Yes   Fall in past 12 months? No     Abuse Screening:   No flowsheet data found. Coordination of Care Questionaire:   1. Have you been to the ER, urgent care clinic since your last visit? Hospitalized since your last visit? No     2. Have you seen or consulted any other health care providers outside of the [address] [place] [address] since your last visit? Include any pap smears or colon screening. Yes, Dr. Isabella Aguilar ENT ears cleaned, Upcoming pelvic exam  Dr. Jessica Perry , eye exam  2018     Advanced Directive:   1. Do you have an Advanced Directive? Yes     2. Would you like information on Advanced Directives?  No  Health Maintenance Due   Topic Date Due    Pneumococcal 65+ Low/Medium Risk (2 of 2 - PPSV23) 10/13/2017    MEDICARE YEARLY EXAM  2018

## 2018-03-21 NOTE — PATIENT INSTRUCTIONS

## 2018-03-21 NOTE — PROGRESS NOTES
Assessment/Plan:    *Diagnoses and all orders for this visit:    1. Pure hypercholesterolemia    2. Vitamin D deficiency    3. Medicare annual wellness visit, subsequent    4. Screening for alcoholism  -     Annual  Alcohol Screen 15 min ()    5. Screening for diabetes mellitus    6. Screening for ischemic heart disease    7. Gastroesophageal reflux disease without esophagitis  -     omeprazole (PRILOSEC) 20 mg capsule; Take 1 Cap by mouth daily. 8. Essential hypertension    I had a long discussion with her about taking her BP medication and how I felt that she really needed this. She is willing to start it and says she will stay on it. F/u 4 months. The plan was discussed with the patient. The patient verbalized understanding and is in agreement with the plan. All medication potential side effects were discussed with the patient.    -------------------------------------------------------------------------------------------------------------------        Yany Motley is a 69 y.o. female and presents with Annual Wellness Visit         Subjective:  Pt here for f/u. We had diagnosed her with HTN in 2017 but she has always been against going on BP meds. She took Lisinopril for a short time then took herself off in Oct 2017. Has not taken anything since. Does monitor her BP at home and says it is well controlled. She has gone through and on and off phase of taking the Lisinopril. . She has struggled with the concept that she probably just needs to be on medication. We were successful in her taking Lisinopril, she tolerated it well but then one day just wanted to stop it. We caught her on a day that her BP looked fine and so she went on thinking that she did not need the medication. Her readings can obviously be uncontrolled as we can see today. HLD: LDL mildly elevated, on Lipitor 20 mg with CoQ 10. We have to be careful with the dose we give her b/c she is sensitive to statins.   Gets muscle and joint pains. Reflux still an issue. Needs prilosec Rx.      ROS:  Constitutional: No recent weight change. No weakness/fatigue. No f/c. Skin: No rashes, change in nails/hair, itching   HENT: No HA, dizziness. No hearing loss/tinnitus. No nasal congestion/discharge. Eyes: No change in vision, double/blurred vision or eye pain/redness. Cardiovascular: No CP/palpitations. No GUTIÉRREZ/orthopnea/PND. Respiratory: No cough/sputum, dyspnea, wheezing. Gastointestinal: No dysphagia, reflux. No n/v. No constipation/diarrhea. No melena/rectal bleeding. Genitourinary: No dysuria, urinary hesitancy, nocturia, hematuria. No incontinence. Musculoskeletal: No joint pain/stiffness. No muscle pain/tenderness. Endo: No heat/cold intolerance, no polyuria/polydypsia. Heme: No h/o anemia. No easy bleeding/bruising. Allergy/Immunology: No seasonal rhinitis. Denies frequent colds, sinus/ear infections. Neurological: No seizures/numbness/weakness. No paresthesias. Psychiatric:  No depression, anxiety. The problem list was updated as a part of today's visit. Patient Active Problem List   Diagnosis Code    HLD (hyperlipidemia) E78.5    GERD (gastroesophageal reflux disease) K21.9    Sciatica of left side M54.32    Osteoarthritis of both knees M17.0    Vitamin D deficiency E55.9    Advance directive discussed with patient Z70.80    Chronic left-sided low back pain with sciatica M54.40, G89.29       The PSH, FH were reviewed. SH:  Social History   Substance Use Topics    Smoking status: Former Smoker     Packs/day: 1.00     Quit date: 8/1/1993    Smokeless tobacco: Never Used    Alcohol use Yes      Comment: once a month, 1 mixed drink       Medications/Allergies:  Current Outpatient Prescriptions on File Prior to Visit   Medication Sig Dispense Refill    atorvastatin (LIPITOR) 20 mg tablet Take 1 Tab by mouth daily.  90 Tab 2    Blood Pressure Monitor (BLOOD PRESSURE KIT) kit For daily use. Dx: I99.8 1 Kit 0    coenzyme q10 10 mg cap Take  by mouth. No current facility-administered medications on file prior to visit. No Known Allergies      Health Maintenance:   Health Maintenance   Topic Date Due    Pneumococcal 65+ Low/Medium Risk (2 of 2 - PPSV23) 10/13/2017    MEDICARE YEARLY EXAM  03/14/2018    GLAUCOMA SCREENING Q2Y  01/12/2019    BREAST CANCER SCRN MAMMOGRAM  04/10/2019    COLONOSCOPY  12/31/2024    DTaP/Tdap/Td series (2 - Td) 10/13/2026    Hepatitis C Screening  Completed    Bone Densitometry (Dexa) Screening  Completed    ZOSTER VACCINE AGE 60>  Completed    Influenza Age 5 to Adult  Addressed       Objective:  Visit Vitals    BP (!) 148/92 (BP 1 Location: Right arm)    Pulse 73    Temp 97.8 °F (36.6 °C) (Oral)    Resp 19    Ht 5' 6.5\" (1.689 m)    Wt 208 lb (94.3 kg)    SpO2 98%    BMI 33.07 kg/m2          Nurses notes and VS reviewed.       Physical Examination: General appearance - alert, well appearing, and in no distress  Ears - bilateral TM's and external ear canals normal  Mouth - mucous membranes moist, pharynx normal without lesions  Neck - supple, no significant adenopathy  Chest - clear to auscultation, no wheezes, rales or rhonchi, symmetric air entry  Heart - normal rate, regular rhythm, normal S1, S2, no murmurs, rubs, clicks or gallops  Abdomen - soft, nontender, nondistended, no masses or organomegaly  Breasts - breasts appear normal, no suspicious masses, no skin or nipple changes or axillary nodes  Musculoskeletal - no joint tenderness, deformity or swelling  Extremities - peripheral pulses normal, no pedal edema, no clubbing or cyanosis        Labwork and Ancillary Studies:    CBC w/Diff  Lab Results   Component Value Date/Time    WBC 5.3 03/09/2018 12:00 AM    HGB 13.4 03/09/2018 12:00 AM    PLATELET 778 (H) 09/72/0182 12:00 AM         Basic Metabolic Profile  Lab Results   Component Value Date/Time    Sodium 143 03/09/2018 12:00 AM    Potassium 4.2 03/09/2018 12:00 AM    Chloride 102 03/09/2018 12:00 AM    CO2 24 03/09/2018 12:00 AM    Anion gap 7 03/08/2016 12:00 PM    Glucose 89 03/09/2018 12:00 AM    BUN 8 03/09/2018 12:00 AM    Creatinine 0.70 03/09/2018 12:00 AM    BUN/Creatinine ratio 11 (L) 03/09/2018 12:00 AM    GFR est  03/09/2018 12:00 AM    GFR est non-AA 89 03/09/2018 12:00 AM    Calcium 9.4 03/09/2018 12:00 AM         LFT  Lab Results   Component Value Date/Time    ALT (SGPT) 12 03/09/2018 12:00 AM    AST (SGOT) 15 03/09/2018 12:00 AM    Alk.  phosphatase 78 03/09/2018 12:00 AM    Bilirubin, direct 0.17 03/09/2018 12:00 AM    Bilirubin, total 0.7 03/09/2018 12:00 AM         Cholesterol  Lab Results   Component Value Date/Time    Cholesterol, total 198 03/09/2018 12:00 AM    HDL Cholesterol 72 03/09/2018 12:00 AM    LDL, calculated 111 (H) 03/09/2018 12:00 AM    Triglyceride 75 03/09/2018 12:00 AM    CHOL/HDL Ratio 3.8 04/04/2016 08:32 AM

## 2018-03-21 NOTE — PROGRESS NOTES
This is the Subsequent Medicare Annual Wellness Exam, performed 12 months or more after the Initial AWV or the last Subsequent AWV    I have reviewed the patient's medical history in detail and updated the computerized patient record. History     Past Medical History:   Diagnosis Date    Chicken pox     Chronic left-sided low back pain with sciatica 1/6/2017    Essential hypertension 6/26/2017    GERD (gastroesophageal reflux disease)     HLD (hyperlipidemia) 6/19/2015    Lumbar stenosis     Measles     Osteoarthritis of both knees 6/19/2015    Sciatica of left side 2009    Shoulder pain 4/13    Left of back going up to the shoulder    Vitamin D deficiency       Past Surgical History:   Procedure Laterality Date    HX GYN  1980    Fibroid Removal    HX ORTHOPAEDIC Bilateral 1981    Knee arthroscopy      Current Outpatient Prescriptions   Medication Sig Dispense Refill    cholecalciferol (VITAMIN D3) 1,000 unit cap Take 4,000 Units by mouth daily.  omeprazole (PRILOSEC) 20 mg capsule Take 1 Cap by mouth daily. 90 Cap 2    atorvastatin (LIPITOR) 20 mg tablet Take 1 Tab by mouth daily. 90 Tab 2    Blood Pressure Monitor (BLOOD PRESSURE KIT) kit For daily use. Dx: I99.8 1 Kit 0    coenzyme q10 10 mg cap Take  by mouth.        No Known Allergies  Family History   Problem Relation Age of Onset    Diabetes Mother      Social History   Substance Use Topics    Smoking status: Former Smoker     Packs/day: 1.00     Quit date: 8/1/1993    Smokeless tobacco: Never Used    Alcohol use Yes      Comment: once a month, 1 mixed drink     Patient Active Problem List   Diagnosis Code    HLD (hyperlipidemia) E78.5    GERD (gastroesophageal reflux disease) K21.9    Sciatica of left side M54.32    Osteoarthritis of both knees M17.0    Vitamin D deficiency E55.9    Advance directive discussed with patient Z70.80    Chronic left-sided low back pain with sciatica M54.40, G89.29    Essential hypertension I10       Depression Risk Factor Screening:     PHQ over the last two weeks 3/21/2018   Little interest or pleasure in doing things Not at all   Feeling down, depressed or hopeless Not at all   Total Score PHQ 2 0     Alcohol Risk Factor Screening: You do not drink alcohol or very rarely. Functional Ability and Level of Safety:   Hearing Loss  Hearing is good. Activities of Daily Living  The home contains: no safety equipment. Patient does total self care    Fall Risk  Fall Risk Assessment, last 12 mths 4/24/2017   Able to walk? Yes   Fall in past 12 months? No       Abuse Screen  Patient is not abused    Cognitive Screening   Evaluation of Cognitive Function:  Has your family/caregiver stated any concerns about your memory: no  Normal    Patient Care Team   Patient Care Team:  Dimas Thakkar MD as PCP - General (Internal Medicine)  Sandi Knott MD (Surgery)  Nicole Hwang MD (Obstetrics & Gynecology)  Anjel Hart MD (Ophthalmology)    Assessment/Plan   Education and counseling provided:  Are appropriate based on today's review and evaluation    Diagnoses and all orders for this visit:    1. Medicare annual wellness visit, subsequent    2. Pure hypercholesterolemia    3. Vitamin D deficiency    4. Essential hypertension    5. Screening for alcoholism  -     Annual  Alcohol Screen 15 min ()    6. Screening for diabetes mellitus    7. Screening for ischemic heart disease    8. Gastroesophageal reflux disease without esophagitis  -     omeprazole (PRILOSEC) 20 mg capsule; Take 1 Cap by mouth daily.         Health Maintenance Due   Topic Date Due    Pneumococcal 65+ Low/Medium Risk (2 of 2 - PPSV23) 10/13/2017    MEDICARE YEARLY EXAM  03/14/2018

## 2018-03-21 NOTE — MR AVS SNAPSHOT
[address] [place] [address] [address] [id number] [facility] [id number] [phone] Patient: Leonarda Morales MRN: [id number] XMA:[id number] Visit Information Date & Time Provider Department Dept. Phone Encounter #  
 3/21/2018  8:00 AM Kve العلي, 3 [facility] [phone] [id number] Follow-up Instructions Return in about 4 months (around 7/21/2018), or if symptoms worsen or fail to improve. Upcoming Health Maintenance Date Due Pneumococcal 65+ Low/Medium Risk (2 of 2 - PPSV23) 10/13/2017 MEDICARE YEARLY EXAM 3/14/2018 GLAUCOMA SCREENING Q2Y 1/12/2019 BREAST CANCER SCRN MAMMOGRAM 4/10/2019 COLONOSCOPY 12/31/2024 DTaP/Tdap/Td series (2 - Td) 10/13/2026 Allergies as of 3/21/2018  Review Complete On: 3/21/2018 By: Kev العلي MD  
 No Known Allergies Current Immunizations  Reviewed on 11/14/2017 Name Date Influenza High Dose Vaccine PF 10/5/2017 Not reviewed this visit You Were Diagnosed With   
  
 Codes Comments Medicare annual wellness visit, subsequent    -  Primary ICD-10-CM: Z00.00 ICD-9-CM: V70.0 Pure hypercholesterolemia     ICD-10-CM: E78.00 ICD-9-CM: 272.0 Vitamin D deficiency     ICD-10-CM: E55.9 ICD-9-CM: 268.9 Essential hypertension     ICD-10-CM: I10 
ICD-9-CM: 401.9 Screening for alcoholism     ICD-10-CM: Z13.89 ICD-9-CM: V79.1 Screening for diabetes mellitus     ICD-10-CM: Z13.1 ICD-9-CM: V77.1 Screening for ischemic heart disease     ICD-10-CM: Z13.6 ICD-9-CM: V81.0 Gastroesophageal reflux disease without esophagitis     ICD-10-CM: K21.9 ICD-9-CM: 530.81 Vitals BP Pulse Temp Resp Height(growth percentile) Weight(growth percentile) (!) 120/98 (BP 1 Location: Left arm, BP Patient Position: Sitting) 73 97.8 °F (36.6 °C) (Oral) 19 5' 6.5\" (1.689 m) 208 lb (94.3 kg) SpO2 BMI OB Status Smoking Status 98% 33.07 kg/m2 Postmenopausal Former Smoker Vitals History BMI and BSA Data Body Mass Index Body Surface Area 33.07 kg/m 2 2.1 m 2 Preferred Pharmacy Pharmacy Name Phone [facility] [id number] [facility], [address] [place] [address] [phone] Your Updated Medication List  
  
   
This list is accurate as of 3/21/18  8:38 AM.  Always use your most recent med list.  
  
  
  
  
 atorvastatin 20 mg tablet Commonly known as:  LIPITOR Take 1 Tab by mouth daily. Blood Pressure Monitor Kit Commonly known as:  BLOOD PRESSURE KIT For daily use. Dx: I99.8  
  
 coenzyme q10 10 mg Cap Take  by mouth. omeprazole 20 mg capsule Commonly known as:  PriLOSEC Take 1 Cap by mouth daily. VITAMIN D3 1,000 unit Cap Generic drug:  cholecalciferol Take 4,000 Units by mouth daily. Prescriptions Sent to Pharmacy Refills  
 omeprazole (PRILOSEC) 20 mg capsule 2 Sig: Take 1 Cap by mouth daily. Class: Normal  
 Pharmacy: [facility] [id number] [facility] Ph #: [phone] Route: Oral  
  
We Performed the Following [place] ANNUAL ALCOHOL SCREEN 15 MIN [id number] [facility]] Follow-up Instructions Return in about 4 months (around 7/21/2018), or if symptoms worsen or fail to improve. Patient Instructions Medicare Wellness Visit, Female The best way to live healthy is to have a healthy lifestyle by eating a well-balanced diet, exercising regularly, limiting alcohol and stopping smoking. Regular physical exams and screening tests are another way to keep healthy. Preventive exams provided by your health care provider can find health problems before they become diseases or illnesses. Preventive services including immunizations, screening tests, monitoring and exams can help you take care of your own health.  
 
All people over age 72 should have a pneumovax  and and a prevnar shot to prevent pneumonia. These are once in a lifetime unless you and your provider decide differently. All people over 65 should have a yearly flu shot and a tetanus vaccine every 10 years. A bone mass density to screen for osteoporosis or thinning of the bones should be done every 2 years after 65. Screening for diabetes mellitus with a blood sugar test should be done every year. Glaucoma is a disease of the eye due to increased ocular pressure that can lead to blindness and it should be done every year by an eye professional. 
 
Cardiovascular screening tests that check for elevated lipids (fatty part of blood) which can lead to heart disease and strokes should be done every 5 years. Colorectal screening that evaluates for blood or polyps in your colon should be done yearly as a stool test or every five years as a flexible sigmoidoscope or every 10 years as a colonoscopy up to age 76. Breast cancer screening with a mammogram is recommended biennially  for women age 54-69. Screening for cervical cancer with a pap smear and pelvic exam is recommended for women after age 72 years every 2 years up to age 79 or when the provider and patient decide to stop. If there is a history of cervical abnormalities or other increased risk for cancer then the test is recommended yearly. Hepatitis C screening is also recommended for anyone born between [birth year] through [address]. A shingles vaccine is also recommended once in a lifetime after age 61. Your Medicare Wellness Exam is recommended annually. Here is a list of your current Health Maintenance items with a due date: 
Health Maintenance Due Topic Date Due  Pneumococcal Vaccine (2 of 2 - PPSV23) 10/13/2017 [address] [place] [address] Annual Well Visit  03/14/2018 Introducing [facility]! Leann Shah introduces [employer] patient portal. Now you can access parts of your medical record, email your doctor's office, and request medication refills online. 1. In your internet browser, go to [email or web address] 2. Click on the First Time User? Click Here link in the Sign In box. You will see the New Member Sign Up page. 3. Enter your [facility] Access Code exactly as it appears below. You will not need to use this code after youve completed the sign-up process. If you do not sign up before the expiration date, you must request a new code. · [employer] Access Code: [email or web address] Expires: 6/19/2018  8:38 AM 
 
4. Enter the last four digits of your Social Security Number (xxxx) and Date of Birth (mm/dd/yyyy) as indicated and click Submit. You will be taken to the next sign-up page. 5. Create a [employer] ID. This will be your [facility] login ID and cannot be changed, so think of one that is secure and easy to remember. 6. Create a [employer] password. You can change your password at any time. 7. Enter your Password Reset Question and Answer. This can be used at a later time if you forget your password. 8. Enter your e-mail address. You will receive e-mail notification when new information is available in [address] 9. Click Sign Up. You can now view and download portions of your medical record. 10. Click the Download Summary menu link to download a portable copy of your medical information. If you have questions, please visit the Frequently Asked Questions section of the [employer] website. Remember, [employer] is NOT to be used for urgent needs. For medical emergencies, dial 911. Now available from your iPhone and Android! Please provide this summary of care documentation to your next provider. Your primary care clinician is listed as Teo [address]. If you have any questions after today's visit, please call [phone].

## 2018-05-25 ENCOUNTER — TELEPHONE (OUTPATIENT)
Dept: FAMILY MEDICINE CLINIC | Age: 68
End: 2018-05-25

## 2018-05-28 DIAGNOSIS — I10 ESSENTIAL HYPERTENSION: Primary | ICD-10-CM

## 2018-05-28 RX ORDER — LISINOPRIL 5 MG/1
5 TABLET ORAL DAILY
Qty: 90 TAB | Refills: 1 | Status: SHIPPED | OUTPATIENT
Start: 2018-05-28 | End: 2018-06-15 | Stop reason: SDUPTHER

## 2018-06-06 ENCOUNTER — TELEPHONE (OUTPATIENT)
Dept: FAMILY MEDICINE CLINIC | Age: 68
End: 2018-06-06

## 2018-06-06 NOTE — TELEPHONE ENCOUNTER
Patient called with concerns about her Blood pressure medication she was on lisinopril made by [employer] she went to  her refill of medication and was given Zestril who is made on [employer] and has these warning on the bottle   Zestril warnings (oral)  check with your doctor before using salt substitutes  Limit alcohol use while taking this medication  May cause a dry cough. If the dry cough persists or becomes distressing report to your physician  This medication may cause dizziness  WARNING: DO NOT use if you are pregnant, plan to become pregnant, or while breastfeeding. Consult your physician or pharmacist  Patient said she doesn't like taking medication and doesn't like that they changed the medication on her and wants to know why it says not to use salt substitutes what does that  Mean and she cant have a drink or what she going to fall out patient stated \"this just isn't right\"  . I tried to explain to patient that this is the same medication just a different name and looks different  because it is made by another company and they have the same ingredients. The  salt substitutes are things that have lower sodium in them says not to take because  Sodium(salt )  can raise BP . Patient requested her concerns be sent to Dr Alli Shannon to address and doesn't want to take this medication until she comes in to talk with the doctor and her next appointment isn't until end of July  I told her she really needs to take medication so her blood pressure is controlled .

## 2018-06-07 NOTE — TELEPHONE ENCOUNTER
Spoke with pt. She will take the new pill and monitor. She just wanted the pcp to be aware of the change.

## 2018-06-07 NOTE — TELEPHONE ENCOUNTER
Please tell pt that changes in the , like in her case, happen quite often and there is no reason to be alarmed. I want her to take the medication that she has and maintain th e continuity of taking her meds. She can certainly make an appt to come in and see me next week so we can go over things but I would like her to take the medication. She has handled the previous one fine and should be fine with this one as well.

## 2018-06-15 ENCOUNTER — OFFICE VISIT (OUTPATIENT)
Dept: FAMILY MEDICINE CLINIC | Age: 68
End: 2018-06-15

## 2018-06-15 VITALS
HEIGHT: 67 IN | TEMPERATURE: 98.8 F | OXYGEN SATURATION: 99 % | WEIGHT: 212 LBS | BODY MASS INDEX: 33.27 KG/M2 | SYSTOLIC BLOOD PRESSURE: 120 MMHG | RESPIRATION RATE: 20 BRPM | HEART RATE: 81 BPM | DIASTOLIC BLOOD PRESSURE: 92 MMHG

## 2018-06-15 DIAGNOSIS — I10 ESSENTIAL HYPERTENSION: ICD-10-CM

## 2018-06-15 RX ORDER — LISINOPRIL 5 MG/1
5 TABLET ORAL DAILY
Qty: 90 TAB | Refills: 1 | Status: SHIPPED | OUTPATIENT
Start: 2018-06-15 | End: 2018-06-26 | Stop reason: SDUPTHER

## 2018-06-15 NOTE — PROGRESS NOTES
Assessment/Plan:    *Diagnoses and all orders for this visit:    1. Essential hypertension  -     lisinopril (PRINIVIL, ZESTRIL) 5 mg tablet; Take 1 Tab by mouth daily. PATIENT NEEDS TO HAVE THE GENERIC FOR PRINIVIL 5 MG. :  [facility]  Indications: hypertension        New Rx given to pt. Low sodium diet info given to pt. The plan was discussed with the patient. The patient verbalized understanding and is in agreement with the plan. All medication potential side effects were discussed with the patient.    -------------------------------------------------------------------------------------------------------------------        Aj Kwong is a 77 y.o. female and presents with Medication Evaluation and Hypertension         Subjective:  Pt here for f/u of her HTN. She has been upset because [employer] changed the pharmaceutical co that is supplying the Lisinopril to them. Pt says the new generic has made her very sick and caused GI upset. She does not want to take the new generic and plans to ask her insurance to find a pharmacy that carries the old generic that she tolerated. ROS:  Constitutional: No recent weight change. No weakness/fatigue. No f/c. Skin: No rashes, change in nails/hair, itching   HENT: No HA, dizziness. No hearing loss/tinnitus. No nasal congestion/discharge. Eyes: No change in vision, double/blurred vision or eye pain/redness. Cardiovascular: No CP/palpitations. No GUTIÉRREZ/orthopnea/PND. Respiratory: No cough/sputum, dyspnea, wheezing. Gastointestinal: No dysphagia, reflux. No n/v. No constipation/diarrhea. No melena/rectal bleeding. Genitourinary: No dysuria, urinary hesitancy, nocturia, hematuria. No incontinence. Musculoskeletal: No joint pain/stiffness. No muscle pain/tenderness. Endo: No heat/cold intolerance, no polyuria/polydypsia. Heme: No h/o anemia. No easy bleeding/bruising. Allergy/Immunology: No seasonal rhinitis.  Denies frequent colds, sinus/ear infections. Neurological: No seizures/numbness/weakness. No paresthesias. Psychiatric:  No depression, anxiety. The problem list was updated as a part of today's visit. Patient Active Problem List   Diagnosis Code    HLD (hyperlipidemia) E78.5    GERD (gastroesophageal reflux disease) K21.9    Sciatica of left side M54.32    Osteoarthritis of both knees M17.0    Vitamin D deficiency E55.9    Advance directive discussed with patient Z70.80    Chronic left-sided low back pain with sciatica M54.40, G89.29    Essential hypertension I10       The PSH, FH were reviewed. SH:  Social History   Substance Use Topics    Smoking status: Former Smoker     Packs/day: 1.00     Quit date: 8/1/1993    Smokeless tobacco: Never Used    Alcohol use Yes      Comment: once a month, 1 mixed drink       Medications/Allergies:  Current Outpatient Prescriptions on File Prior to Visit   Medication Sig Dispense Refill    cholecalciferol (VITAMIN D3) 1,000 unit cap Take 4,000 Units by mouth daily.  omeprazole (PRILOSEC) 20 mg capsule Take 1 Cap by mouth daily. 90 Cap 2    atorvastatin (LIPITOR) 20 mg tablet Take 1 Tab by mouth daily. 90 Tab 2    Blood Pressure Monitor (BLOOD PRESSURE KIT) kit For daily use. Dx: I99.8 1 Kit 0    coenzyme q10 10 mg cap Take  by mouth. No current facility-administered medications on file prior to visit.          No Known Allergies      Health Maintenance:   Health Maintenance   Topic Date Due    Pneumococcal 65+ Low/Medium Risk (2 of 2 - PPSV23) 10/13/2017    Influenza Age 5 to Adult  08/01/2018    GLAUCOMA SCREENING Q2Y  01/12/2019    MEDICARE YEARLY EXAM  03/22/2019    BREAST CANCER SCRN MAMMOGRAM  04/20/2020    COLONOSCOPY  12/31/2024    DTaP/Tdap/Td series (2 - Td) 10/13/2026    Hepatitis C Screening  Completed    Bone Densitometry (Dexa) Screening  Completed    ZOSTER VACCINE AGE 60>  Completed       Objective:  Visit Vitals    BP (!) 120/92 (BP 1 Location: Left arm, BP Patient Position: Sitting)    Pulse 81    Temp 98.8 °F (37.1 °C) (Oral)    Resp 20    Ht 5' 6.5\" (1.689 m)    Wt 212 lb (96.2 kg)    SpO2 99%    BMI 33.71 kg/m2          Nurses notes and VS reviewed. Physical Examination: General appearance - alert, well appearing, and in no distress        Labwork and Ancillary Studies:    CBC w/Diff  Lab Results   Component Value Date/Time    WBC 5.3 03/09/2018 12:00 AM    HGB 13.4 03/09/2018 12:00 AM    PLATELET 748 (H) 56/26/9608 12:00 AM         Basic Metabolic Profile  Lab Results   Component Value Date/Time    Sodium 143 03/09/2018 12:00 AM    Potassium 4.2 03/09/2018 12:00 AM    Chloride 102 03/09/2018 12:00 AM    CO2 24 03/09/2018 12:00 AM    Anion gap 7 03/08/2016 12:00 PM    Glucose 89 03/09/2018 12:00 AM    BUN 8 03/09/2018 12:00 AM    Creatinine 0.70 03/09/2018 12:00 AM    BUN/Creatinine ratio 11 (L) 03/09/2018 12:00 AM    GFR est  03/09/2018 12:00 AM    GFR est non-AA 89 03/09/2018 12:00 AM    Calcium 9.4 03/09/2018 12:00 AM         LFT  Lab Results   Component Value Date/Time    ALT (SGPT) 12 03/09/2018 12:00 AM    AST (SGOT) 15 03/09/2018 12:00 AM    Alk.  phosphatase 78 03/09/2018 12:00 AM    Bilirubin, direct 0.17 03/09/2018 12:00 AM    Bilirubin, total 0.7 03/09/2018 12:00 AM         Cholesterol  Lab Results   Component Value Date/Time    Cholesterol, total 198 03/09/2018 12:00 AM    HDL Cholesterol 72 03/09/2018 12:00 AM    LDL, calculated 111 (H) 03/09/2018 12:00 AM    Triglyceride 75 03/09/2018 12:00 AM    CHOL/HDL Ratio 3.8 04/04/2016 08:32 AM

## 2018-06-15 NOTE — MR AVS SNAPSHOT
[address] [facility] 
 
 
 [address] [facility] [address] 
[phone] Patient: Aj Kwong MRN: [id number] Visit Information Date & Time Provider Department Dept. Phone Encounter #  
 6/15/2018  2:30 PM Danita Britton, 3 [facility] [phone] Your Appointments 7/23/2018  8:00 AM  
Follow Up with Danita Britton MD  
[facility] Appt Note: 4 month f/u appt [address] [facility] [address]  
[phone]  
  
   
 [address] Upcoming Health Maintenance Date Due Pneumococcal 65+ Low/Medium Risk (2 of 2 - PPSV23) 10/13/2017 Influenza Age 5 to Adult 8/1/2018 GLAUCOMA SCREENING Q2Y 1/12/2019 MEDICARE YEARLY EXAM 3/22/2019 BREAST CANCER SCRN MAMMOGRAM 4/20/2020 COLONOSCOPY 12/31/2024 DTaP/Tdap/Td series (2 - Td) 10/13/2026 Allergies as of 6/15/2018  Review Complete On: 6/15/2018 By: Peter Newman No Known Allergies Current Immunizations  Reviewed on 11/14/2017 Name Date Influenza High Dose Vaccine PF 10/5/2017 Not reviewed this visit You Were Diagnosed With   
  
 Codes Comments Essential hypertension     ICD-10-CM: I10 
ICD-9-CM: 401.9 Vitals BP Pulse Temp Resp Height(growth percentile) Weight(growth percentile) (!) 120/92 (BP 1 Location: Left arm, BP Patient Position: Sitting) 81 98.8 °F (37.1 °C) (Oral) 20 5' 6.5\" (1.689 m) 212 lb (96.2 kg) SpO2 BMI OB Status Smoking Status 99% 33.71 kg/m2 Postmenopausal Former Smoker Vitals History BMI and BSA Data Body Mass Index Body Surface Area 33.71 kg/m 2 2.12 m 2 Preferred Pharmacy Pharmacy Name Phone [facility] [address] [facility], [address] [place] [address] [phone] Your Updated Medication List  
  
   
 This list is accurate as of 6/15/18  2:55 PM.  Always use your most recent med list.  
  
  
  
  
 atorvastatin 20 mg tablet Commonly known as:  LIPITOR Take 1 Tab by mouth daily. Blood Pressure Monitor Kit Commonly known as:  BLOOD PRESSURE KIT For daily use. Dx: I99.8  
  
 coenzyme q10 10 mg Cap Take  by mouth.  
  
 lisinopril 5 mg tablet Commonly known as:  Kary Golder Take 1 Tab by mouth daily. PATIENT NEEDS TO HAVE THE GENERIC FOR PRINIVIL 5 MG. :  [employer]  Indications: hypertension  
  
 omeprazole 20 mg capsule Commonly known as:  PriLOSEC Take 1 Cap by mouth daily. VITAMIN D3 1,000 unit Cap Generic drug:  cholecalciferol Take 4,000 Units by mouth daily. Prescriptions Printed Refills  
 lisinopril (PRINIVIL, ZESTRIL) 5 mg tablet 1 Sig: Take 1 Tab by mouth daily. PATIENT NEEDS TO HAVE THE GENERIC FOR PRINIVIL 5 MG. :  [employer]  Indications: hypertension Class: Print Route: Oral  
  
Patient Instructions High Blood Pressure: Care Instructions Your Care Instructions If your blood pressure is usually above 140/90, you have high blood pressure, or hypertension. That means the top number is 140 or higher or the bottom number is 90 or higher, or both. Despite what a lot of people think, high blood pressure usually doesn't cause headaches or make you feel dizzy or lightheaded. It usually has no symptoms. But it does increase your risk for heart attack, stroke, and kidney or eye damage. The higher your blood pressure, the more your risk increases. Your doctor will give you a goal for your blood pressure. Your goal will be based on your health and your age. An example of a goal is to keep your blood pressure below 140/90. Lifestyle changes, such as eating healthy and being active, are always important to help lower blood pressure.  You might also take medicine to reach your blood pressure goal. 
 Follow-up care is a key part of your treatment and safety. Be sure to make and go to all appointments, and call your doctor if you are having problems. It's also a good idea to know your test results and keep a list of the medicines you take. How can you care for yourself at home? Medical treatment · If you stop taking your medicine, your blood pressure will go back up. You may take one or more types of medicine to lower your blood pressure. Be safe with medicines. Take your medicine exactly as prescribed. Call your doctor if you think you are having a problem with your medicine. · Talk to your doctor before you start taking aspirin every day. Aspirin can help certain people lower their risk of a heart attack or stroke. But taking aspirin isn't right for everyone, because it can cause serious bleeding. · See your doctor regularly. You may need to see the doctor more often at first or until your blood pressure comes down. · If you are taking blood pressure medicine, talk to your doctor before you take decongestants or anti-inflammatory medicine, such as ibuprofen. Some of these medicines can raise blood pressure. · Learn how to check your blood pressure at home. Lifestyle changes · Stay at a healthy weight. This is especially important if you put on weight around the waist. Losing even 10 pounds can help you lower your blood pressure. · If your doctor recommends it, get more exercise. Walking is a good choice. Bit by bit, increase the amount you walk every day. Try for at least 30 minutes on most days of the week. You also may want to swim, bike, or do other activities. · Avoid or limit alcohol. Talk to your doctor about whether you can drink any alcohol. · Try to limit how much sodium you eat to less than 2,300 milligrams (mg) a day. Your doctor may ask you to try to eat less than 1,500 mg a day.  
· Eat plenty of fruits (such as bananas and oranges), vegetables, legumes, whole grains, and low-fat dairy products. · Lower the amount of saturated fat in your diet. Saturated fat is found in animal products such as milk, cheese, and meat. Limiting these foods may help you lose weight and also lower your risk for heart disease. · Do not smoke. Smoking increases your risk for heart attack and stroke. If you need help quitting, talk to your doctor about stop-smoking programs and medicines. These can increase your chances of quitting for good. When should you call for help? Call 911 anytime you think you may need emergency care. This may mean having symptoms that suggest that your blood pressure is causing a serious heart or blood vessel problem. Your blood pressure may be over 180/110. ? For example, call 911 if: 
? · You have symptoms of a heart attack. These may include: ¨ Chest pain or pressure, or a strange feeling in the chest. 
¨ Sweating. ¨ Shortness of breath. ¨ Nausea or vomiting. ¨ Pain, pressure, or a strange feeling in the back, neck, jaw, or upper belly or in one or both shoulders or arms. ¨ Lightheadedness or sudden weakness. ¨ A fast or irregular heartbeat. ? · You have symptoms of a stroke. These may include: 
¨ Sudden numbness, tingling, weakness, or loss of movement in your face, arm, or leg, especially on only one side of your body. ¨ Sudden vision changes. ¨ Sudden trouble speaking. ¨ Sudden confusion or trouble understanding simple statements. ¨ Sudden problems with walking or balance. ¨ A sudden, severe headache that is different from past headaches. ? · You have severe back or belly pain. ?Do not wait until your blood pressure comes down on its own. Get help right away. ?Call your doctor now or seek immediate care if: 
? · Your blood pressure is much higher than normal (such as 180/110 or higher), but you don't have symptoms. ? · You think high blood pressure is causing symptoms, such as: ¨ Severe headache. ¨ Blurry vision. ?Watch closely for changes in your health, and be sure to contact your doctor if: 
? · Your blood pressure measures 140/90 or higher at least 2 times. That means the top number is 140 or higher or the bottom number is 90 or higher, or both. ? · You think you may be having side effects from your blood pressure medicine. ? · Your blood pressure is usually normal, but it goes above normal at least 2 times. Where can you learn more? Go to http://stephanie-mariella.info/. Enter U829 in the search box to learn more about \"High Blood Pressure: Care Instructions. \" Current as of: September 21, 2016 Content Version: 11.4 © 9402-5215 [employer]. Care instructions adapted under license by [employer] (which disclaims liability or warranty for this information). If you have questions about a medical condition or this instruction, always ask your healthcare professional. [place] [address] any warranty or liability for your use of this information. Low Sodium Diet (2,000 Milligram): Care Instructions Your Care Instructions Too much sodium causes your body to hold on to extra water. This can raise your blood pressure and force your heart and kidneys to work harder. In very serious cases, this could cause you to be put in the hospital. It might even be life-threatening. By limiting sodium, you will feel better and lower your risk of serious problems. The most common source of sodium is salt. People get most of the salt in their diet from canned, prepared, and packaged foods. Fast food and restaurant meals also are very high in sodium. Your doctor will probably limit your sodium to less than 2,000 milligrams (mg) a day. This limit counts all the sodium in prepared and packaged foods and any salt you add to your food. Follow-up care is a key part of your treatment and safety.  Be sure to make and go to all appointments, and call your doctor if you are having problems. It's also a good idea to know your test results and keep a list of the medicines you take. How can you care for yourself at home? Read food labels · Read labels on cans and food packages. The labels tell you how much sodium is in each serving. Make sure that you look at the serving size. If you eat more than the serving size, you have eaten more sodium. · Food labels also tell you the Percent Daily Value for sodium. Choose products with low Percent Daily Values for sodium. · Be aware that sodium can come in forms other than salt, including monosodium glutamate (MSG), sodium citrate, and sodium bicarbonate (baking soda). MSG is often added to Asian food. When you eat out, you can sometimes ask for food without MSG or added salt. Buy low-sodium foods · Buy foods that are labeled \"unsalted\" (no salt added), \"sodium-free\" (less than 5 mg of sodium per serving), or \"low-sodium\" (less than 140 mg of sodium per serving). Foods labeled \"reduced-sodium\" and \"light sodium\" may still have too much sodium. Be sure to read the label to see how much sodium you are getting. · Buy fresh vegetables, or frozen vegetables without added sauces. Buy low-sodium versions of canned vegetables, soups, and other canned goods. Prepare low-sodium meals · Cut back on the amount of salt you use in cooking. This will help you adjust to the taste. Do not add salt after cooking. One teaspoon of salt has about 2,300 mg of sodium. · Take the salt shaker off the table. · Flavor your food with garlic, lemon juice, onion, vinegar, herbs, and spices. Do not use soy sauce, lite soy sauce, steak sauce, onion salt, garlic salt, celery salt, mustard, or ketchup on your food. · Use low-sodium salad dressings, sauces, and ketchup. Or make your own salad dressings and sauces without adding salt. · Use less salt (or none) when recipes call for it.  You can often use half the salt a recipe calls for without losing flavor. Other foods such as rice, pasta, and grains do not need added salt. · Rinse canned vegetables, and cook them in fresh water. This removes some-but not all-of the salt. · Avoid water that is naturally high in sodium or that has been treated with water softeners, which add sodium. Call your local water company to find out the sodium content of your water supply. If you buy bottled water, read the label and choose a sodium-free brand. Avoid high-sodium foods · Avoid eating: ¨ Smoked, cured, salted, and canned meat, fish, and poultry. ¨ Ham, storm, hot dogs, and luncheon meats. ¨ Regular, hard, and processed cheese and regular peanut butter. ¨ Crackers with salted tops, and other salted snack foods such as pretzels, chips, and salted popcorn. ¨ Frozen prepared meals, unless labeled low-sodium. ¨ Canned and dried soups, broths, and bouillon, unless labeled sodium-free or low-sodium. ¨ Canned vegetables, unless labeled sodium-free or low-sodium. ¨ Western Ara fries, pizza, tacos, and other fast foods. ¨ Pickles, olives, ketchup, and other condiments, especially soy sauce, unless labeled sodium-free or low-sodium. Where can you learn more? Go to http://stephanie-mariella.info/. Enter W439 in the search box to learn more about \"Low Sodium Diet (2,000 Milligram): Care Instructions. \" Current as of: May 12, 2017 Content Version: 11.4 © 2965-5742 [employer]. Care instructions adapted under license by [employer] (which disclaims liability or warranty for this information). If you have questions about a medical condition or this instruction, always ask your healthcare professional. [place] [address] any warranty or liability for your use of this information. Low Sodium Diet (2,000 Milligram): Care Instructions Your Care Instructions Too much sodium causes your body to hold on to extra water. This can raise your blood pressure and force your heart and kidneys to work harder. In very serious cases, this could cause you to be put in the hospital. It might even be life-threatening. By limiting sodium, you will feel better and lower your risk of serious problems. The most common source of sodium is salt. People get most of the salt in their diet from canned, prepared, and packaged foods. Fast food and restaurant meals also are very high in sodium. Your doctor will probably limit your sodium to less than 2,000 milligrams (mg) a day. This limit counts all the sodium in prepared and packaged foods and any salt you add to your food. Follow-up care is a key part of your treatment and safety. Be sure to make and go to all appointments, and call your doctor if you are having problems. It's also a good idea to know your test results and keep a list of the medicines you take. How can you care for yourself at home? Read food labels · Read labels on cans and food packages. The labels tell you how much sodium is in each serving. Make sure that you look at the serving size. If you eat more than the serving size, you have eaten more sodium. · Food labels also tell you the Percent Daily Value for sodium. Choose products with low Percent Daily Values for sodium. · Be aware that sodium can come in forms other than salt, including monosodium glutamate (MSG), sodium citrate, and sodium bicarbonate (baking soda). MSG is often added to Asian food. When you eat out, you can sometimes ask for food without MSG or added salt. Buy low-sodium foods · Buy foods that are labeled \"unsalted\" (no salt added), \"sodium-free\" (less than 5 mg of sodium per serving), or \"low-sodium\" (less than 140 mg of sodium per serving). Foods labeled \"reduced-sodium\" and \"light sodium\" may still have too much sodium. Be sure to read the label to see how much sodium you are getting. · Buy fresh vegetables, or frozen vegetables without added sauces. Buy low-sodium versions of canned vegetables, soups, and other canned goods. Prepare low-sodium meals · Cut back on the amount of salt you use in cooking. This will help you adjust to the taste. Do not add salt after cooking. One teaspoon of salt has about 2,300 mg of sodium. · Take the salt shaker off the table. · Flavor your food with garlic, lemon juice, onion, vinegar, herbs, and spices. Do not use soy sauce, lite soy sauce, steak sauce, onion salt, garlic salt, celery salt, mustard, or ketchup on your food. · Use low-sodium salad dressings, sauces, and ketchup. Or make your own salad dressings and sauces without adding salt. · Use less salt (or none) when recipes call for it. You can often use half the salt a recipe calls for without losing flavor. Other foods such as rice, pasta, and grains do not need added salt. · Rinse canned vegetables, and cook them in fresh water. This removes some-but not all-of the salt. · Avoid water that is naturally high in sodium or that has been treated with water softeners, which add sodium. Call your local water company to find out the sodium content of your water supply. If you buy bottled water, read the label and choose a sodium-free brand. Avoid high-sodium foods · Avoid eating: ¨ Smoked, cured, salted, and canned meat, fish, and poultry. ¨ Ham, storm, hot dogs, and luncheon meats. ¨ Regular, hard, and processed cheese and regular peanut butter. ¨ Crackers with salted tops, and other salted snack foods such as pretzels, chips, and salted popcorn. ¨ Frozen prepared meals, unless labeled low-sodium. ¨ Canned and dried soups, broths, and bouillon, unless labeled sodium-free or low-sodium. ¨ Canned vegetables, unless labeled sodium-free or low-sodium. ¨ Western Ara fries, pizza, tacos, and other fast foods.  
¨ Pickles, olives, ketchup, and other condiments, especially soy sauce, unless labeled sodium-free or low-sodium. Where can you learn more? Go to http://stephanie-mariella.info/. Enter K792 in the search box to learn more about \"Low Sodium Diet (2,000 Milligram): Care Instructions. \" Current as of: May 12, 2017 Content Version: 11.4 © 6305-4600 [employer]. Care instructions adapted under license by [employer] (which disclaims liability or warranty for this information). If you have questions about a medical condition or this instruction, always ask your healthcare professional. [address] any warranty or liability for your use of this information. Introducing [facility]! Jojo Lee introduces Georgina Goodman patient portal. Now you can access parts of your medical record, email your doctor's office, and request medication refills online. 1. In your internet browser, go to [email or web address] 2. Click on the First Time User? Click Here link in the Sign In box. You will see the New Member Sign Up page. 3. Enter your Georgina Goodman Access Code exactly as it appears below. You will not need to use this code after youve completed the sign-up process. If you do not sign up before the expiration date, you must request a new code. · Georgina Goodman Access Code: [email or web address] Expires: 6/19/2018  8:38 AM 
 
4. Enter the last four digits of your Social Security Number (xxxx) and Date of Birth (mm/dd/yyyy) as indicated and click Submit. You will be taken to the next sign-up page. 5. Create a "[employer]t ID. This will be your Georgina Goodman login ID and cannot be changed, so think of one that is secure and easy to remember. 6. Create a "[employer]t password. You can change your password at any time. 7. Enter your Password Reset Question and Answer. This can be used at a later time if you forget your password. 8. Enter your e-mail address.  You will receive e-mail notification when new information is available in [employer]. 9. Click Sign Up. You can now view and download portions of your medical record. 10. Click the Download Summary menu link to download a portable copy of your medical information. If you have questions, please visit the Frequently Asked Questions section of the [employer] website. Remember, [employer] is NOT to be used for urgent needs. For medical emergencies, dial 911. Now available from your iPhone and Android! Please provide this summary of care documentation to your next provider. Your primary care clinician is listed as Te[address] 51. If you have any questions after today's visit, please call [phone].

## 2018-06-15 NOTE — PATIENT INSTRUCTIONS
High Blood Pressure: Care Instructions  Your Care Instructions    If your blood pressure is usually above 140/90, you have high blood pressure, or hypertension. That means the top number is 140 or higher or the bottom number is 90 or higher, or both. Despite what a lot of people think, high blood pressure usually doesn't cause headaches or make you feel dizzy or lightheaded. It usually has no symptoms. But it does increase your risk for heart attack, stroke, and kidney or eye damage. The higher your blood pressure, the more your risk increases. Your doctor will give you a goal for your blood pressure. Your goal will be based on your health and your age. An example of a goal is to keep your blood pressure below 140/90. Lifestyle changes, such as eating healthy and being active, are always important to help lower blood pressure. You might also take medicine to reach your blood pressure goal.  Follow-up care is a key part of your treatment and safety. Be sure to make and go to all appointments, and call your doctor if you are having problems. It's also a good idea to know your test results and keep a list of the medicines you take. How can you care for yourself at home? Medical treatment  · If you stop taking your medicine, your blood pressure will go back up. You may take one or more types of medicine to lower your blood pressure. Be safe with medicines. Take your medicine exactly as prescribed. Call your doctor if you think you are having a problem with your medicine. · Talk to your doctor before you start taking aspirin every day. Aspirin can help certain people lower their risk of a heart attack or stroke. But taking aspirin isn't right for everyone, because it can cause serious bleeding. · See your doctor regularly. You may need to see the doctor more often at first or until your blood pressure comes down.   · If you are taking blood pressure medicine, talk to your doctor before you take decongestants or anti-inflammatory medicine, such as ibuprofen. Some of these medicines can raise blood pressure. · Learn how to check your blood pressure at home. Lifestyle changes  · Stay at a healthy weight. This is especially important if you put on weight around the waist. Losing even 10 pounds can help you lower your blood pressure. · If your doctor recommends it, get more exercise. Walking is a good choice. Bit by bit, increase the amount you walk every day. Try for at least 30 minutes on most days of the week. You also may want to swim, bike, or do other activities. · Avoid or limit alcohol. Talk to your doctor about whether you can drink any alcohol. · Try to limit how much sodium you eat to less than 2,300 milligrams (mg) a day. Your doctor may ask you to try to eat less than 1,500 mg a day. · Eat plenty of fruits (such as bananas and oranges), vegetables, legumes, whole grains, and low-fat dairy products. · Lower the amount of saturated fat in your diet. Saturated fat is found in animal products such as milk, cheese, and meat. Limiting these foods may help you lose weight and also lower your risk for heart disease. · Do not smoke. Smoking increases your risk for heart attack and stroke. If you need help quitting, talk to your doctor about stop-smoking programs and medicines. These can increase your chances of quitting for good. When should you call for help? Call 911 anytime you think you may need emergency care. This may mean having symptoms that suggest that your blood pressure is causing a serious heart or blood vessel problem. Your blood pressure may be over 180/110. ? For example, call 911 if:  ? · You have symptoms of a heart attack. These may include:  ¨ Chest pain or pressure, or a strange feeling in the chest.  ¨ Sweating. ¨ Shortness of breath. ¨ Nausea or vomiting.   ¨ Pain, pressure, or a strange feeling in the back, neck, jaw, or upper belly or in one or both shoulders or arms.  ¨ Lightheadedness or sudden weakness. ¨ A fast or irregular heartbeat. ? · You have symptoms of a stroke. These may include:  ¨ Sudden numbness, tingling, weakness, or loss of movement in your face, arm, or leg, especially on only one side of your body. ¨ Sudden vision changes. ¨ Sudden trouble speaking. ¨ Sudden confusion or trouble understanding simple statements. ¨ Sudden problems with walking or balance. ¨ A sudden, severe headache that is different from past headaches. ? · You have severe back or belly pain. ?Do not wait until your blood pressure comes down on its own. Get help right away. ?Call your doctor now or seek immediate care if:  ? · Your blood pressure is much higher than normal (such as 180/110 or higher), but you don't have symptoms. ? · You think high blood pressure is causing symptoms, such as:  ¨ Severe headache. ¨ Blurry vision. ? Watch closely for changes in your health, and be sure to contact your doctor if:  ? · Your blood pressure measures 140/90 or higher at least 2 times. That means the top number is 140 or higher or the bottom number is 90 or higher, or both. ? · You think you may be having side effects from your blood pressure medicine. ? · Your blood pressure is usually normal, but it goes above normal at least 2 times. Where can you learn more? Go to http://stephanie-mariella.info/. Enter V057 in the search box to learn more about \"High Blood Pressure: Care Instructions. \"  Current as of: September 21, 2016  Content Version: 11.4  © 9098-3522 [employer]. Care instructions adapted under license by [employer] (which disclaims liability or warranty for this information). If you have questions about a medical condition or this instruction, always ask your healthcare professional. [place] [address] any warranty or liability for your use of this information.        Low Sodium Diet (2,000 Milligram): Care Instructions  Your Care Instructions    Too much sodium causes your body to hold on to extra water. This can raise your blood pressure and force your heart and kidneys to work harder. In very serious cases, this could cause you to be put in the hospital. It might even be life-threatening. By limiting sodium, you will feel better and lower your risk of serious problems. The most common source of sodium is salt. People get most of the salt in their diet from canned, prepared, and packaged foods. Fast food and restaurant meals also are very high in sodium. Your doctor will probably limit your sodium to less than 2,000 milligrams (mg) a day. This limit counts all the sodium in prepared and packaged foods and any salt you add to your food. Follow-up care is a key part of your treatment and safety. Be sure to make and go to all appointments, and call your doctor if you are having problems. It's also a good idea to know your test results and keep a list of the medicines you take. How can you care for yourself at home? Read food labels  · Read labels on cans and food packages. The labels tell you how much sodium is in each serving. Make sure that you look at the serving size. If you eat more than the serving size, you have eaten more sodium. · Food labels also tell you the Percent Daily Value for sodium. Choose products with low Percent Daily Values for sodium. · Be aware that sodium can come in forms other than salt, including monosodium glutamate (MSG), sodium citrate, and sodium bicarbonate (baking soda). MSG is often added to Asian food. When you eat out, you can sometimes ask for food without MSG or added salt. Buy low-sodium foods  · Buy foods that are labeled \"unsalted\" (no salt added), \"sodium-free\" (less than 5 mg of sodium per serving), or \"low-sodium\" (less than 140 mg of sodium per serving). Foods labeled \"reduced-sodium\" and \"light sodium\" may still have too much sodium.  Be sure to read the label to see how much sodium you are getting. · Buy fresh vegetables, or frozen vegetables without added sauces. Buy low-sodium versions of canned vegetables, soups, and other canned goods. Prepare low-sodium meals  · Cut back on the amount of salt you use in cooking. This will help you adjust to the taste. Do not add salt after cooking. One teaspoon of salt has about 2,300 mg of sodium. · Take the salt shaker off the table. · Flavor your food with garlic, lemon juice, onion, vinegar, herbs, and spices. Do not use soy sauce, lite soy sauce, steak sauce, onion salt, garlic salt, celery salt, mustard, or ketchup on your food. · Use low-sodium salad dressings, sauces, and ketchup. Or make your own salad dressings and sauces without adding salt. · Use less salt (or none) when recipes call for it. You can often use half the salt a recipe calls for without losing flavor. Other foods such as rice, pasta, and grains do not need added salt. · Rinse canned vegetables, and cook them in fresh water. This removes some-but not all-of the salt. · Avoid water that is naturally high in sodium or that has been treated with water softeners, which add sodium. Call your local water company to find out the sodium content of your water supply. If you buy bottled water, read the label and choose a sodium-free brand. Avoid high-sodium foods  · Avoid eating:  ¨ Smoked, cured, salted, and canned meat, fish, and poultry. ¨ Ham, storm, hot dogs, and luncheon meats. ¨ Regular, hard, and processed cheese and regular peanut butter. ¨ Crackers with salted tops, and other salted snack foods such as pretzels, chips, and salted popcorn. ¨ Frozen prepared meals, unless labeled low-sodium. ¨ Canned and dried soups, broths, and bouillon, unless labeled sodium-free or low-sodium. ¨ Canned vegetables, unless labeled sodium-free or low-sodium. ¨ Western Ara fries, pizza, tacos, and other fast foods.   ¨ Pickles, olives, ketchup, and other condiments, especially soy sauce, unless labeled sodium-free or low-sodium. Where can you learn more? Go to http://stephanie-mariella.info/. Enter U442 in the search box to learn more about \"Low Sodium Diet (2,000 Milligram): Care Instructions. \"  Current as of: May 12, 2017  Content Version: 11.4  © 2054-7913 [employer]. Care instructions adapted under license by [employer] (which disclaims liability or warranty for this information). If you have questions about a medical condition or this instruction, always ask your healthcare professional. [place] [address] any warranty or liability for your use of this information. Low Sodium Diet (2,000 Milligram): Care Instructions  Your Care Instructions    Too much sodium causes your body to hold on to extra water. This can raise your blood pressure and force your heart and kidneys to work harder. In very serious cases, this could cause you to be put in the hospital. It might even be life-threatening. By limiting sodium, you will feel better and lower your risk of serious problems. The most common source of sodium is salt. People get most of the salt in their diet from canned, prepared, and packaged foods. Fast food and restaurant meals also are very high in sodium. Your doctor will probably limit your sodium to less than 2,000 milligrams (mg) a day. This limit counts all the sodium in prepared and packaged foods and any salt you add to your food. Follow-up care is a key part of your treatment and safety. Be sure to make and go to all appointments, and call your doctor if you are having problems. It's also a good idea to know your test results and keep a list of the medicines you take. How can you care for yourself at home? Read food labels  · Read labels on cans and food packages. The labels tell you how much sodium is in each serving. Make sure that you look at the serving size.  If you eat more than the serving size, you have eaten more sodium. · Food labels also tell you the Percent Daily Value for sodium. Choose products with low Percent Daily Values for sodium. · Be aware that sodium can come in forms other than salt, including monosodium glutamate (MSG), sodium citrate, and sodium bicarbonate (baking soda). MSG is often added to Asian food. When you eat out, you can sometimes ask for food without MSG or added salt. Buy low-sodium foods  · Buy foods that are labeled \"unsalted\" (no salt added), \"sodium-free\" (less than 5 mg of sodium per serving), or \"low-sodium\" (less than 140 mg of sodium per serving). Foods labeled \"reduced-sodium\" and \"light sodium\" may still have too much sodium. Be sure to read the label to see how much sodium you are getting. · Buy fresh vegetables, or frozen vegetables without added sauces. Buy low-sodium versions of canned vegetables, soups, and other canned goods. Prepare low-sodium meals  · Cut back on the amount of salt you use in cooking. This will help you adjust to the taste. Do not add salt after cooking. One teaspoon of salt has about 2,300 mg of sodium. · Take the salt shaker off the table. · Flavor your food with garlic, lemon juice, onion, vinegar, herbs, and spices. Do not use soy sauce, lite soy sauce, steak sauce, onion salt, garlic salt, celery salt, mustard, or ketchup on your food. · Use low-sodium salad dressings, sauces, and ketchup. Or make your own salad dressings and sauces without adding salt. · Use less salt (or none) when recipes call for it. You can often use half the salt a recipe calls for without losing flavor. Other foods such as rice, pasta, and grains do not need added salt. · Rinse canned vegetables, and cook them in fresh water. This removes some-but not all-of the salt. · Avoid water that is naturally high in sodium or that has been treated with water softeners, which add sodium.  Call your local water company to find out the sodium content of your water supply. If you buy bottled water, read the label and choose a sodium-free brand. Avoid high-sodium foods  · Avoid eating:  ¨ Smoked, cured, salted, and canned meat, fish, and poultry. ¨ Ham, storm, hot dogs, and luncheon meats. ¨ Regular, hard, and processed cheese and regular peanut butter. ¨ Crackers with salted tops, and other salted snack foods such as pretzels, chips, and salted popcorn. ¨ Frozen prepared meals, unless labeled low-sodium. ¨ Canned and dried soups, broths, and bouillon, unless labeled sodium-free or low-sodium. ¨ Canned vegetables, unless labeled sodium-free or low-sodium. ¨ Western Ara fries, pizza, tacos, and other fast foods. ¨ Pickles, olives, ketchup, and other condiments, especially soy sauce, unless labeled sodium-free or low-sodium. Where can you learn more? Go to http://stephanie-mariella.info/. Enter U258 in the search box to learn more about \"Low Sodium Diet (2,000 Milligram): Care Instructions. \"  Current as of: May 12, 2017  Content Version: 11.4  © 6657-8857 [employer]. Care instructions adapted under license by [employer] (which disclaims liability or warranty for this information). If you have questions about a medical condition or this instruction, always ask your healthcare professional. [place] [address] any warranty or liability for your use of this information.

## 2018-06-15 NOTE — PROGRESS NOTES
Haley Gould is a 77 y.o. female (: 1950) presenting to address:    Chief Complaint   Patient presents with    Medication Evaluation    Hypertension       Vitals:    06/15/18 1426   BP: (!) 120/92   Pulse: 81   Resp: 20   Temp: 98.8 °F (37.1 °C)   TempSrc: Oral   SpO2: 99%   Weight: 212 lb (96.2 kg)   Height: 5' 6.5\" (1.689 m)   PainSc:   8   PainLoc: Generalized       Learning Assessment:     Learning Assessment 10/2/2014   PRIMARY LEARNER Patient   HIGHEST LEVEL OF EDUCATION - PRIMARY LEARNER  4 YEARS OF COLLEGE   BARRIERS PRIMARY LEARNER NONE   PRIMARY LANGUAGE ENGLISH   LEARNER PREFERENCE PRIMARY LISTENING   ANSWERED BY pt   RELATIONSHIP OTHER     Depression Screening:     PHQ over the last two weeks 3/21/2018   Little interest or pleasure in doing things Not at all   Feeling down, depressed or hopeless Not at all   Total Score PHQ 2 0     Fall Risk Assessment:     Fall Risk Assessment, last 12 mths 6/15/2018   Able to walk? Yes   Fall in past 12 months? Yes   Fall with injury? No   Number of falls in past 12 months 1   Fall Risk Score 1     Abuse Screening:     Abuse Screening Questionnaire 3/21/2018   Do you ever feel afraid of your partner? N   Are you in a relationship with someone who physically or mentally threatens you? N   Is it safe for you to go home? Y     Coordination of Care Questionaire:   1. Have you been to the ER, urgent care clinic since your last visit? Hospitalized since your last visit? NO    2. Have you seen or consulted any other health care providers outside of the [address] [place] [address] since your last visit? Include any pap smears or colon screening. NO    Advanced Directive:   1. Do you have an Advanced Directive? YES    2. Would you like information on Advanced Directives?  NO

## 2018-06-26 DIAGNOSIS — I10 ESSENTIAL HYPERTENSION: ICD-10-CM

## 2018-06-26 RX ORDER — LISINOPRIL 5 MG/1
5 TABLET ORAL DAILY
Qty: 90 TAB | Refills: 1 | Status: SHIPPED | OUTPATIENT
Start: 2018-06-26 | End: 2018-07-23 | Stop reason: SINTOL

## 2018-06-26 NOTE — TELEPHONE ENCOUNTER
Patient [facility] pharmacy is requesting a med refill of Lisinopril 5 mg    Last visit: 6/15/18    Last refill: 6/15/18- [facility]

## 2018-07-03 DIAGNOSIS — I10 ESSENTIAL HYPERTENSION: ICD-10-CM

## 2018-07-03 RX ORDER — LISINOPRIL 5 MG/1
5 TABLET ORAL DAILY
Qty: 90 TAB | Refills: 1 | Status: CANCELLED | OUTPATIENT
Start: 2018-07-03

## 2018-07-03 NOTE — TELEPHONE ENCOUNTER
[facility] called stating they do not use [facility]. They use [facility] and cannot order from [facility] just for that one patient. They have inactivated the Rx for now. Please advise.

## 2018-07-04 NOTE — TELEPHONE ENCOUNTER
Please notify pt of this information from [facility]. If she is set on getting this , she will need to find a pharmacy that carries it.

## 2018-07-19 ENCOUNTER — TELEPHONE (OUTPATIENT)
Dept: FAMILY MEDICINE CLINIC | Age: 68
End: 2018-07-19

## 2018-07-19 NOTE — TELEPHONE ENCOUNTER
Pt came in to have her cholesterol checked but does not have labs in the system and pt is currently waiting in the lobby for the order to be placed. Please advise.

## 2018-07-23 ENCOUNTER — OFFICE VISIT (OUTPATIENT)
Dept: FAMILY MEDICINE CLINIC | Age: 68
End: 2018-07-23

## 2018-07-23 VITALS
TEMPERATURE: 98.5 F | BODY MASS INDEX: 33.12 KG/M2 | DIASTOLIC BLOOD PRESSURE: 94 MMHG | WEIGHT: 211 LBS | RESPIRATION RATE: 18 BRPM | SYSTOLIC BLOOD PRESSURE: 130 MMHG | HEART RATE: 79 BPM | OXYGEN SATURATION: 97 % | HEIGHT: 67 IN

## 2018-07-23 DIAGNOSIS — I10 ESSENTIAL HYPERTENSION: Primary | ICD-10-CM

## 2018-07-23 DIAGNOSIS — E66.9 OBESITY (BMI 30-39.9): ICD-10-CM

## 2018-07-23 RX ORDER — AMLODIPINE BESYLATE 2.5 MG/1
2.5 TABLET ORAL DAILY
Qty: 90 TAB | Refills: 1 | Status: SHIPPED | OUTPATIENT
Start: 2018-07-23 | End: 2019-03-06 | Stop reason: SDUPTHER

## 2018-07-23 NOTE — PROGRESS NOTES
Mackenzie Olmos is a 77 y.o. female (: 1950) presenting to address:    Chief Complaint   Patient presents with    Hypertension       Vitals:    18 0801   Pulse: 79   Resp: 18   Temp: 98.5 °F (36.9 °C)   TempSrc: Oral   SpO2: 97%   Weight: 211 lb (95.7 kg)   Height: 5' 6.5\" (1.689 m)   PainSc:   5   PainLoc: Knee       Hearing/Vision:   No exam data present    Learning Assessment:     Learning Assessment 10/2/2014   PRIMARY LEARNER Patient   HIGHEST LEVEL OF EDUCATION - PRIMARY LEARNER  4 YEARS OF COLLEGE   BARRIERS PRIMARY LEARNER NONE   PRIMARY LANGUAGE ENGLISH   LEARNER PREFERENCE PRIMARY LISTENING   ANSWERED BY pt   RELATIONSHIP OTHER     Depression Screening:     PHQ over the last two weeks 2018   Little interest or pleasure in doing things Not at all   Feeling down, depressed, irritable, or hopeless Not at all   Total Score PHQ 2 0     Fall Risk Assessment:     Fall Risk Assessment, last 12 mths 2018   Able to walk? Yes   Fall in past 12 months? Yes   Fall with injury? No   Number of falls in past 12 months 1   Fall Risk Score 1     Abuse Screening:     Abuse Screening Questionnaire 3/21/2018   Do you ever feel afraid of your partner? N   Are you in a relationship with someone who physically or mentally threatens you? N   Is it safe for you to go home? Y     Coordination of Care Questionaire:   1. Have you been to the ER, urgent care clinic since your last visit? Hospitalized since your last visit? No     2. Have you seen or consulted any other health care providers outside of the [address] [place] [address] since your last visit? Include any pap smears or colon screening. Yes, ortho Dr Nicolle Knutson     Advanced Directive:   1. Do you have an Advanced Directive? No     2. Would you like information on Advanced Directives?   No

## 2018-07-23 NOTE — ACP (ADVANCE CARE PLANNING)
Advance Care Planning (ACP) Provider Conversation Snapshot    Date of ACP Conversation: 07/23/18  Persons included in Conversation:  patient  Length of ACP Conversation in minutes:  <16 minutes (Non-Billable)    Authorized Decision Maker (if patient is incapable of making informed decisions):    This person is:   Healthcare Agent/Medical Power of  under Advance Directive      Conversation Outcomes / Follow-Up Plan:   Recommended completion of Advance Directive form after review of ACP materials and conversation with prospective healthcare agent

## 2018-07-23 NOTE — PROGRESS NOTES
Assessment/Plan:    *Diagnoses and all orders for this visit:    1. Essential hypertension    2. Obesity (BMI 30-39. 9)    Other orders  -     amLODIPine (NORVASC) 2.5 mg tablet; Take 1 Tab by mouth daily. Will cont to work on her weight. Will change her BP med to Norvasc. D/C Lisinopril. F/u 4 weeks. The plan was discussed with the patient. The patient verbalized understanding and is in agreement with the plan. All medication potential side effects were discussed with the patient.    -------------------------------------------------------------------------------------------------------------------        Adam Pete is a 77 y.o. female and presents with Hypertension         Subjective:  Pt here to f/u on HTN. She has not been taking anything because she was not willing to try and locate a pharmacy that carries the specific generic brand of Lisinopril that she was looking for. She initially wanted to stay on that particular med and brand because she did not have any side effects from it. Now, she is willing to try something different. Obesity: needs to lose weight. ROS:  Constitutional: No recent weight change. No weakness/fatigue. No f/c. Skin: No rashes, change in nails/hair, itching   HENT: No HA, dizziness. No hearing loss/tinnitus. No nasal congestion/discharge. Eyes: No change in vision, double/blurred vision or eye pain/redness. Cardiovascular: No CP/palpitations. No GUTIÉRREZ/orthopnea/PND. Respiratory: No cough/sputum, dyspnea, wheezing. Gastointestinal: No dysphagia, reflux. No n/v. No constipation/diarrhea. No melena/rectal bleeding. Genitourinary: No dysuria, urinary hesitancy, nocturia, hematuria. No incontinence. Musculoskeletal: No joint pain/stiffness. No muscle pain/tenderness. Endo: No heat/cold intolerance, no polyuria/polydypsia. Heme: No h/o anemia. No easy bleeding/bruising. Allergy/Immunology: No seasonal rhinitis.  Denies frequent colds, sinus/ear infections. Neurological: No seizures/numbness/weakness. No paresthesias. Psychiatric:  No depression, anxiety. The problem list was updated as a part of today's visit. Patient Active Problem List   Diagnosis Code    HLD (hyperlipidemia) E78.5    GERD (gastroesophageal reflux disease) K21.9    Sciatica of left side M54.32    Osteoarthritis of both knees M17.0    Vitamin D deficiency E55.9    Advance directive discussed with patient Z70.80    Chronic left-sided low back pain with sciatica M54.40, G89.29    Essential hypertension I10       The PSH, FH were reviewed. SH:  Social History   Substance Use Topics    Smoking status: Former Smoker     Packs/day: 1.00     Quit date: 8/1/1993    Smokeless tobacco: Never Used    Alcohol use Yes      Comment: once a month, 1 mixed drink       Medications/Allergies:  Current Outpatient Prescriptions on File Prior to Visit   Medication Sig Dispense Refill    cholecalciferol (VITAMIN D3) 1,000 unit cap Take 4,000 Units by mouth daily.  omeprazole (PRILOSEC) 20 mg capsule Take 1 Cap by mouth daily. 90 Cap 2    atorvastatin (LIPITOR) 20 mg tablet Take 1 Tab by mouth daily. 90 Tab 2    Blood Pressure Monitor (BLOOD PRESSURE KIT) kit For daily use. Dx: I99.8 1 Kit 0    coenzyme q10 10 mg cap Take  by mouth. No current facility-administered medications on file prior to visit.          No Known Allergies      Health Maintenance:   Health Maintenance   Topic Date Due    Pneumococcal 65+ Low/Medium Risk (2 of 2 - PPSV23) 10/13/2017    Influenza Age 5 to Adult  08/01/2018    GLAUCOMA SCREENING Q2Y  01/12/2019    MEDICARE YEARLY EXAM  03/22/2019    BREAST CANCER SCRN MAMMOGRAM  04/20/2020    COLONOSCOPY  12/31/2024    DTaP/Tdap/Td series (2 - Td) 10/13/2026    Hepatitis C Screening  Completed    Bone Densitometry (Dexa) Screening  Completed    ZOSTER VACCINE AGE 60>  Completed       Objective:  Visit Vitals    BP (!) 130/94    Pulse 79    Temp 98.5 °F (36.9 °C) (Oral)    Resp 18    Ht 5' 6.5\" (1.689 m)    Wt 211 lb (95.7 kg)    SpO2 97%    BMI 33.55 kg/m2          Nurses notes and VS reviewed. Physical Examination: General appearance - alert, well appearing, and in no distress  Chest - clear to auscultation, no wheezes, rales or rhonchi, symmetric air entry  Heart - normal rate, regular rhythm, normal S1, S2, no murmurs, rubs, clicks or gallops          Labwork and Ancillary Studies:    CBC w/Diff  Lab Results   Component Value Date/Time    WBC 5.3 03/09/2018 12:00 AM    HGB 13.4 03/09/2018 12:00 AM    PLATELET 217 (H) 17/18/8682 12:00 AM         Basic Metabolic Profile  Lab Results   Component Value Date/Time    Sodium 143 03/09/2018 12:00 AM    Potassium 4.2 03/09/2018 12:00 AM    Chloride 102 03/09/2018 12:00 AM    CO2 24 03/09/2018 12:00 AM    Anion gap 7 03/08/2016 12:00 PM    Glucose 89 03/09/2018 12:00 AM    BUN 8 03/09/2018 12:00 AM    Creatinine 0.70 03/09/2018 12:00 AM    BUN/Creatinine ratio 11 (L) 03/09/2018 12:00 AM    GFR est  03/09/2018 12:00 AM    GFR est non-AA 89 03/09/2018 12:00 AM    Calcium 9.4 03/09/2018 12:00 AM         LFT  Lab Results   Component Value Date/Time    ALT (SGPT) 12 03/09/2018 12:00 AM    AST (SGOT) 15 03/09/2018 12:00 AM    Alk.  phosphatase 78 03/09/2018 12:00 AM    Bilirubin, direct 0.17 03/09/2018 12:00 AM    Bilirubin, total 0.7 03/09/2018 12:00 AM         Cholesterol  Lab Results   Component Value Date/Time    Cholesterol, total 198 03/09/2018 12:00 AM    HDL Cholesterol 72 03/09/2018 12:00 AM    LDL, calculated 111 (H) 03/09/2018 12:00 AM    Triglyceride 75 03/09/2018 12:00 AM    CHOL/HDL Ratio 3.8 04/04/2016 08:32 AM

## 2018-07-23 NOTE — MR AVS SNAPSHOT
[address] [facility] [address] [phone] Patient: Raymond Daley MRN: [id number]:[id number] Visit Information Date & Time Provider Department Dept. Phone Encounter #  
 7/23/2018  8:00 AM Madison Tafoya MD [employer] [facility] [phone] [id number] Upcoming Health Maintenance Date Due Pneumococcal 65+ Low/Medium Risk (2 of 2 - PPSV23) 10/13/2017 Influenza Age 5 to Adult 8/1/2018 GLAUCOMA SCREENING Q2Y 1/12/2019 MEDICARE YEARLY EXAM 3/22/2019 BREAST CANCER SCRN MAMMOGRAM 4/20/2020 COLONOSCOPY 12/31/2024 DTaP/Tdap/Td series (2 - Td) 10/13/2026 Allergies as of 7/23/2018  Review Complete On: 7/23/2018 By: Madison Tafoya MD  
 No Known Allergies Current Immunizations  Reviewed on 11/14/2017 Name Date Influenza High Dose Vaccine PF 10/5/2017 Not reviewed this visit You Were Diagnosed With   
  
 Codes Comments Essential hypertension    -  Primary ICD-10-CM: I10 
ICD-9-CM: 401.9 Vitals BP Pulse Temp Resp Height(growth percentile) Weight(growth percentile) (!) 130/94 79 98.5 °F (36.9 °C) (Oral) 18 5' 6.5\" (1.689 m) 211 lb (95.7 kg) SpO2 BMI OB Status Smoking Status 97% 33.55 kg/m2 Postmenopausal Former Smoker Vitals History BMI and BSA Data Body Mass Index Body Surface Area  
 33.55 kg/m 2 2.12 m 2 Preferred Pharmacy Pharmacy Name Phone [facility] [address] [facility], [address] [place] [address] [phone] Your Updated Medication List  
  
   
This list is accurate as of 7/23/18  8:24 AM.  Always use your most recent med list. amLODIPine 2.5 mg tablet Commonly known as:  Manjit Ruths Take 1 Tab by mouth daily. atorvastatin 20 mg tablet Commonly known as:  LIPITOR Take 1 Tab by mouth daily. Blood Pressure Monitor Kit Commonly known as:  BLOOD PRESSURE KIT  
 For daily use. Dx: I99.8  
  
 coenzyme q10 10 mg Cap Take  by mouth.  
  
 lisinopril 5 mg tablet Commonly known as:  Jeremias Montejo Take 1 Tab by mouth daily. PATIENT NEEDS TO HAVE THE GENERIC FOR PRINIVIL 5 MG. :  Sandoz  Indications: hypertension  
  
 omeprazole 20 mg capsule Commonly known as:  PriLOSEC Take 1 Cap by mouth daily. VITAMIN D3 1,000 unit Cap Generic drug:  cholecalciferol Take 4,000 Units by mouth daily. Prescriptions Sent to Pharmacy Refills  
 amLODIPine (NORVASC) 2.5 mg tablet 1 Sig: Take 1 Tab by mouth daily. Class: Normal  
 Pharmacy: [facility] [id number] [facility] #: [phone] Route: Oral  
  
Patient Instructions High Blood Pressure: Care Instructions Your Care Instructions If your blood pressure is usually above 130/80, you have high blood pressure, or hypertension. That means the top number is 130 or higher or the bottom number is 80 or higher, or both. Despite what a lot of people think, high blood pressure usually doesn't cause headaches or make you feel dizzy or lightheaded. It usually has no symptoms. But it does increase your risk for heart attack, stroke, and kidney or eye damage. The higher your blood pressure, the more your risk increases. Your doctor will give you a goal for your blood pressure. Your goal will be based on your health and your age. Lifestyle changes, such as eating healthy and being active, are always important to help lower blood pressure. You might also take medicine to reach your blood pressure goal. 
Follow-up care is a key part of your treatment and safety. Be sure to make and go to all appointments, and call your doctor if you are having problems. It's also a good idea to know your test results and keep a list of the medicines you take. How can you care for yourself at home? Medical treatment · If you stop taking your medicine, your blood pressure will go back up. You may take one or more types of medicine to lower your blood pressure. Be safe with medicines. Take your medicine exactly as prescribed. Call your doctor if you think you are having a problem with your medicine. · Talk to your doctor before you start taking aspirin every day. Aspirin can help certain people lower their risk of a heart attack or stroke. But taking aspirin isn't right for everyone, because it can cause serious bleeding. · See your doctor regularly. You may need to see the doctor more often at first or until your blood pressure comes down. · If you are taking blood pressure medicine, talk to your doctor before you take decongestants or anti-inflammatory medicine, such as ibuprofen. Some of these medicines can raise blood pressure. · Learn how to check your blood pressure at home. Lifestyle changes · Stay at a healthy weight. This is especially important if you put on weight around the waist. Losing even 10 pounds can help you lower your blood pressure. · If your doctor recommends it, get more exercise. Walking is a good choice. Bit by bit, increase the amount you walk every day. Try for at least 30 minutes on most days of the week. You also may want to swim, bike, or do other activities. · Avoid or limit alcohol. Talk to your doctor about whether you can drink any alcohol. · Try to limit how much sodium you eat to less than 2,300 milligrams (mg) a day. Your doctor may ask you to try to eat less than 1,500 mg a day. · Eat plenty of fruits (such as bananas and oranges), vegetables, legumes, whole grains, and low-fat dairy products. · Lower the amount of saturated fat in your diet. Saturated fat is found in animal products such as milk, cheese, and meat. Limiting these foods may help you lose weight and also lower your risk for heart disease. · Do not smoke. Smoking increases your risk for heart attack and stroke. If you need help quitting, talk to your doctor about stop-smoking programs and medicines. These can increase your chances of quitting for good. When should you call for help? Call 911 anytime you think you may need emergency care. This may mean having symptoms that suggest that your blood pressure is causing a serious heart or blood vessel problem. Your blood pressure may be over 180/110. 
 For example, call 911 if: 
  · You have symptoms of a heart attack. These may include: ¨ Chest pain or pressure, or a strange feeling in the chest. 
¨ Sweating. ¨ Shortness of breath. ¨ Nausea or vomiting. ¨ Pain, pressure, or a strange feeling in the back, neck, jaw, or upper belly or in one or both shoulders or arms. ¨ Lightheadedness or sudden weakness. ¨ A fast or irregular heartbeat.  
  · You have symptoms of a stroke. These may include: 
¨ Sudden numbness, tingling, weakness, or loss of movement in your face, arm, or leg, especially on only one side of your body. ¨ Sudden vision changes. ¨ Sudden trouble speaking. ¨ Sudden confusion or trouble understanding simple statements. ¨ Sudden problems with walking or balance. ¨ A sudden, severe headache that is different from past headaches.  
  · You have severe back or belly pain.  
 Do not wait until your blood pressure comes down on its own. Get help right away. 
 Call your doctor now or seek immediate care if: 
  · Your blood pressure is much higher than normal (such as 180/110 or higher), but you don't have symptoms.  
  · You think high blood pressure is causing symptoms, such as: ¨ Severe headache. ¨ Blurry vision.  
 Watch closely for changes in your health, and be sure to contact your doctor if: 
  · Your blood pressure measures 140/90 or higher at least 2 times. That means the top number is 140 or higher or the bottom number is 90 or higher, or both.  
  · You think you may be having side effects from your blood pressure medicine.   · Your blood pressure is usually normal, but it goes above normal at least 2 times. Where can you learn more? Go to http://stephanie-mariella.info/. Enter T706 in the search box to learn more about \"High Blood Pressure: Care Instructions. \" Current as of: December 6, 2017 Content Version: 11.7 © 8467-7124 codebender. Care instructions adapted under license by [employer] (which disclaims liability or warranty for this information). If you have questions about a medical condition or this instruction, always ask your healthcare professional. [address] any warranty or liability for your use of this information. Introducing [facility]! [facility] introduces Scope 5 patient portal. Now you can access parts of your medical record, email your doctor's office, and request medication refills online. 1. In your internet browser, go to [email or web address] 2. Click on the First Time User? Click Here link in the Sign In box. You will see the New Member Sign Up page. 3. Enter your Scope 5 Access Code exactly as it appears below. You will not need to use this code after youve completed the sign-up process. If you do not sign up before the expiration date, you must request a new code. · Scope 5 Access Code: [email or web address] Expires: 10/21/2018  8:12 AM 
 
4. Enter the last four digits of your Social Security Number (xxxx) and Date of Birth (mm/dd/yyyy) as indicated and click Submit. You will be taken to the next sign-up page. 5. Create a Gojit ID. This will be your Scope 5 login ID and cannot be changed, so think of one that is secure and easy to remember. 6. Create a Scope 5 password. You can change your password at any time. 7. Enter your Password Reset Question and Answer. This can be used at a later time if you forget your password. 8. Enter your e-mail address.  You will receive e-mail notification when new information is available in [employer]. 9. Click Sign Up. You can now view and download portions of your medical record. 10. Click the Download Summary menu link to download a portable copy of your medical information. If you have questions, please visit the Frequently Asked Questions section of the [employer] website. Remember, [employer] is NOT to be used for urgent needs. For medical emergencies, dial 911. Now available from your iPhone and Android! Please provide this summary of care documentation to your next provider. Your primary care clinician is listed as Teo 51. If you have any questions after today's visit, please call [phone].

## 2018-07-23 NOTE — PATIENT INSTRUCTIONS

## 2018-08-20 ENCOUNTER — OFFICE VISIT (OUTPATIENT)
Dept: FAMILY MEDICINE CLINIC | Age: 68
End: 2018-08-20

## 2018-08-20 VITALS
WEIGHT: 208.6 LBS | RESPIRATION RATE: 20 BRPM | HEART RATE: 73 BPM | TEMPERATURE: 97.6 F | BODY MASS INDEX: 32.74 KG/M2 | OXYGEN SATURATION: 98 % | HEIGHT: 67 IN | SYSTOLIC BLOOD PRESSURE: 130 MMHG | DIASTOLIC BLOOD PRESSURE: 88 MMHG

## 2018-08-20 DIAGNOSIS — I10 ESSENTIAL HYPERTENSION: Primary | ICD-10-CM

## 2018-08-20 NOTE — PATIENT INSTRUCTIONS

## 2018-08-20 NOTE — PROGRESS NOTES
Assessment/Plan:    *Diagnoses and all orders for this visit:    1. Essential hypertension      Doing well with Norvasc. Will continue. Routine F/U in Dec.    The plan was discussed with the patient. The patient verbalized understanding and is in agreement with the plan. All medication potential side effects were discussed with the patient.    -------------------------------------------------------------------------------------------------------------------        Jacque Mckeon is a 77 y.o. female and presents with Hypertension         Subjective:  Pt returns to f/u on BP after we stopped Lisinopril and started Norvasc. ROS:  Constitutional: No recent weight change. No weakness/fatigue. No f/c. Skin: No rashes, change in nails/hair, itching   HENT: No HA, dizziness. No hearing loss/tinnitus. No nasal congestion/discharge. Eyes: No change in vision, double/blurred vision or eye pain/redness. Cardiovascular: No CP/palpitations. No GUTIÉRREZ/orthopnea/PND. Respiratory: No cough/sputum, dyspnea, wheezing. Gastointestinal: No dysphagia, reflux. No n/v. No constipation/diarrhea. No melena/rectal bleeding. Genitourinary: No dysuria, urinary hesitancy, nocturia, hematuria. No incontinence. Musculoskeletal: No joint pain/stiffness. No muscle pain/tenderness. Endo: No heat/cold intolerance, no polyuria/polydypsia. Heme: No h/o anemia. No easy bleeding/bruising. Allergy/Immunology: No seasonal rhinitis. Denies frequent colds, sinus/ear infections. Neurological: No seizures/numbness/weakness. No paresthesias. Psychiatric:  No depression, anxiety. The problem list was updated as a part of today's visit.   Patient Active Problem List   Diagnosis Code    HLD (hyperlipidemia) E78.5    GERD (gastroesophageal reflux disease) K21.9    Sciatica of left side M54.32    Osteoarthritis of both knees M17.0    Vitamin D deficiency E55.9    Advance directive discussed with patient Z65.80    Chronic left-sided low back pain with sciatica M54.40, G89.29    Essential hypertension I10       The PSH, FH were reviewed. SH:  Social History   Substance Use Topics    Smoking status: Former Smoker     Packs/day: 1.00     Quit date: 8/1/1993    Smokeless tobacco: Never Used    Alcohol use Yes      Comment: once a month, 1 mixed drink       Medications/Allergies:  Current Outpatient Prescriptions on File Prior to Visit   Medication Sig Dispense Refill    amLODIPine (NORVASC) 2.5 mg tablet Take 1 Tab by mouth daily. 90 Tab 1    cholecalciferol (VITAMIN D3) 1,000 unit cap Take 4,000 Units by mouth daily.  omeprazole (PRILOSEC) 20 mg capsule Take 1 Cap by mouth daily. 90 Cap 2    atorvastatin (LIPITOR) 20 mg tablet Take 1 Tab by mouth daily. 90 Tab 2    Blood Pressure Monitor (BLOOD PRESSURE KIT) kit For daily use. Dx: I99.8 1 Kit 0    coenzyme q10 10 mg cap Take  by mouth. No current facility-administered medications on file prior to visit. No Known Allergies      Health Maintenance:   Health Maintenance   Topic Date Due    Pneumococcal 65+ Low/Medium Risk (2 of 2 - PPSV23) 10/13/2017    Influenza Age 5 to Adult  08/01/2018    GLAUCOMA SCREENING Q2Y  01/12/2019    MEDICARE YEARLY EXAM  03/22/2019    BREAST CANCER SCRN MAMMOGRAM  04/20/2020    COLONOSCOPY  12/31/2024    DTaP/Tdap/Td series (2 - Td) 10/13/2026    Hepatitis C Screening  Completed    Bone Densitometry (Dexa) Screening  Completed    ZOSTER VACCINE AGE 60>  Completed       Objective:  Visit Vitals    /88 (BP 1 Location: Left arm, BP Patient Position: Sitting)    Pulse 73    Temp 97.6 °F (36.4 °C) (Oral)    Resp 20    Ht 5' 7\" (1.702 m)    Wt 208 lb 9.6 oz (94.6 kg)    SpO2 98%    BMI 32.67 kg/m2          Nurses notes and VS reviewed.       Physical Examination: General appearance - alert, well appearing, and in no distress  Chest - clear to auscultation, no wheezes, rales or rhonchi, symmetric air entry  Heart - normal rate, regular rhythm, normal S1, S2, no murmurs, rubs, clicks or gallops    Physical Examination: General appearance - alert, well appearing, and in no distress      Labwork and Ancillary Studies:    CBC w/Diff  Lab Results   Component Value Date/Time    WBC 5.3 03/09/2018 12:00 AM    HGB 13.4 03/09/2018 12:00 AM    PLATELET 759 (H) 47/46/3468 12:00 AM         Basic Metabolic Profile  Lab Results   Component Value Date/Time    Sodium 143 03/09/2018 12:00 AM    Potassium 4.2 03/09/2018 12:00 AM    Chloride 102 03/09/2018 12:00 AM    CO2 24 03/09/2018 12:00 AM    Anion gap 7 03/08/2016 12:00 PM    Glucose 89 03/09/2018 12:00 AM    BUN 8 03/09/2018 12:00 AM    Creatinine 0.70 03/09/2018 12:00 AM    BUN/Creatinine ratio 11 (L) 03/09/2018 12:00 AM    GFR est  03/09/2018 12:00 AM    GFR est non-AA 89 03/09/2018 12:00 AM    Calcium 9.4 03/09/2018 12:00 AM         LFT  Lab Results   Component Value Date/Time    ALT (SGPT) 12 03/09/2018 12:00 AM    AST (SGOT) 15 03/09/2018 12:00 AM    Alk.  phosphatase 78 03/09/2018 12:00 AM    Bilirubin, direct 0.17 03/09/2018 12:00 AM    Bilirubin, total 0.7 03/09/2018 12:00 AM         Cholesterol  Lab Results   Component Value Date/Time    Cholesterol, total 198 03/09/2018 12:00 AM    HDL Cholesterol 72 03/09/2018 12:00 AM    LDL, calculated 111 (H) 03/09/2018 12:00 AM    Triglyceride 75 03/09/2018 12:00 AM    CHOL/HDL Ratio 3.8 04/04/2016 08:32 AM

## 2018-08-20 NOTE — MR AVS SNAPSHOT
[address]  [address] 2822 [facility] [address] [phone] Patient: Andrew Cazares MRN: [id number] NBD:[id number]/78/[id number] Visit Information Date & Time Provider Department Dept. Phone Encounter #  
 8/20/2018  8:30 AM Anitra Hutchinson, 3 [facility] [phone] [id number] Upcoming Health Maintenance Date Due Pneumococcal 65+ Low/Medium Risk (2 of 2 - PPSV23) 10/13/2017 Influenza Age 5 to Adult 8/1/2018 GLAUCOMA SCREENING Q2Y 1/12/2019 MEDICARE YEARLY EXAM 3/22/2019 BREAST CANCER SCRN MAMMOGRAM 4/20/2020 COLONOSCOPY 12/31/2024 DTaP/Tdap/Td series (2 - Td) 10/13/2026 Allergies as of 8/20/2018  Review Complete On: 8/20/2018 By: Anitra Hutchinson MD  
 No Known Allergies Current Immunizations  Reviewed on 11/14/2017 Name Date Influenza High Dose Vaccine PF 10/5/2017 Not reviewed this visit You Were Diagnosed With   
  
 Codes Comments Essential hypertension    -  Primary ICD-10-CM: I10 
ICD-9-CM: 401.9 Vitals BP Pulse Temp Resp Height(growth percentile) Weight(growth percentile) 130/88 (BP 1 Location: Left arm, BP Patient Position: Sitting) 73 97.6 °F (36.4 °C) (Oral) 20 5' 7\" (1.702 m) 208 lb 9.6 oz (94.6 kg) SpO2 BMI OB Status Smoking Status 98% 32.67 kg/m2 Postmenopausal Former Smoker Vitals History BMI and BSA Data Body Mass Index Body Surface Area  
 32.67 kg/m 2 2.11 m 2 Preferred Pharmacy Pharmacy Name Phone [facility] [address] [facility], [address] [place] [address] [phone] Your Updated Medication List  
  
   
This list is accurate as of 8/20/18  8:49 AM.  Always use your most recent med list. amLODIPine 2.5 mg tablet Commonly known as:  Tiffanie Medardo Take 1 Tab by mouth daily. atorvastatin 20 mg tablet Commonly known as:  LIPITOR Take 1 Tab by mouth daily. Blood Pressure Monitor Kit Commonly known as:  BLOOD PRESSURE KIT For daily use. Dx: I99.8  
  
 coenzyme q10 10 mg Cap Take  by mouth. omeprazole 20 mg capsule Commonly known as:  PriLOSEC Take 1 Cap by mouth daily. VITAMIN D3 1,000 unit Cap Generic drug:  cholecalciferol Take 4,000 Units by mouth daily. Patient Instructions High Blood Pressure: Care Instructions Your Care Instructions If your blood pressure is usually above 130/80, you have high blood pressure, or hypertension. That means the top number is 130 or higher or the bottom number is 80 or higher, or both. Despite what a lot of people think, high blood pressure usually doesn't cause headaches or make you feel dizzy or lightheaded. It usually has no symptoms. But it does increase your risk for heart attack, stroke, and kidney or eye damage. The higher your blood pressure, the more your risk increases. Your doctor will give you a goal for your blood pressure. Your goal will be based on your health and your age. Lifestyle changes, such as eating healthy and being active, are always important to help lower blood pressure. You might also take medicine to reach your blood pressure goal. 
Follow-up care is a key part of your treatment and safety. Be sure to make and go to all appointments, and call your doctor if you are having problems. It's also a good idea to know your test results and keep a list of the medicines you take. How can you care for yourself at home? Medical treatment · If you stop taking your medicine, your blood pressure will go back up. You may take one or more types of medicine to lower your blood pressure. Be safe with medicines. Take your medicine exactly as prescribed. Call your doctor if you think you are having a problem with your medicine. · Talk to your doctor before you start taking aspirin every day.  Aspirin can help certain people lower their risk of a heart attack or stroke. But taking aspirin isn't right for everyone, because it can cause serious bleeding. · See your doctor regularly. You may need to see the doctor more often at first or until your blood pressure comes down. · If you are taking blood pressure medicine, talk to your doctor before you take decongestants or anti-inflammatory medicine, such as ibuprofen. Some of these medicines can raise blood pressure. · Learn how to check your blood pressure at home. Lifestyle changes · Stay at a healthy weight. This is especially important if you put on weight around the waist. Losing even 10 pounds can help you lower your blood pressure. · If your doctor recommends it, get more exercise. Walking is a good choice. Bit by bit, increase the amount you walk every day. Try for at least 30 minutes on most days of the week. You also may want to swim, bike, or do other activities. · Avoid or limit alcohol. Talk to your doctor about whether you can drink any alcohol. · Try to limit how much sodium you eat to less than 2,300 milligrams (mg) a day. Your doctor may ask you to try to eat less than 1,500 mg a day. · Eat plenty of fruits (such as bananas and oranges), vegetables, legumes, whole grains, and low-fat dairy products. · Lower the amount of saturated fat in your diet. Saturated fat is found in animal products such as milk, cheese, and meat. Limiting these foods may help you lose weight and also lower your risk for heart disease. · Do not smoke. Smoking increases your risk for heart attack and stroke. If you need help quitting, talk to your doctor about stop-smoking programs and medicines. These can increase your chances of quitting for good. When should you call for help? Call 911 anytime you think you may need emergency care.  This may mean having symptoms that suggest that your blood pressure is causing a serious heart or blood vessel problem. Your blood pressure may be over 180/110. 
 For example, call 911 if: 
  · You have symptoms of a heart attack. These may include: ¨ Chest pain or pressure, or a strange feeling in the chest. 
¨ Sweating. ¨ Shortness of breath. ¨ Nausea or vomiting. ¨ Pain, pressure, or a strange feeling in the back, neck, jaw, or upper belly or in one or both shoulders or arms. ¨ Lightheadedness or sudden weakness. ¨ A fast or irregular heartbeat.  
  · You have symptoms of a stroke. These may include: 
¨ Sudden numbness, tingling, weakness, or loss of movement in your face, arm, or leg, especially on only one side of your body. ¨ Sudden vision changes. ¨ Sudden trouble speaking. ¨ Sudden confusion or trouble understanding simple statements. ¨ Sudden problems with walking or balance. ¨ A sudden, severe headache that is different from past headaches.  
  · You have severe back or belly pain.  
 Do not wait until your blood pressure comes down on its own. Get help right away. 
 Call your doctor now or seek immediate care if: 
  · Your blood pressure is much higher than normal (such as 180/110 or higher), but you don't have symptoms.  
  · You think high blood pressure is causing symptoms, such as: ¨ Severe headache. ¨ Blurry vision.  
 Watch closely for changes in your health, and be sure to contact your doctor if: 
  · Your blood pressure measures 140/90 or higher at least 2 times. That means the top number is 140 or higher or the bottom number is 90 or higher, or both.  
  · You think you may be having side effects from your blood pressure medicine.  
  · Your blood pressure is usually normal, but it goes above normal at least 2 times. Where can you learn more? Go to http://stephanie-mariella.info/. Enter R892 in the search box to learn more about \"High Blood Pressure: Care Instructions. \" Current as of: December 6, 2017 Content Version: 11.7 © 1790-4158 [employer]. Care instructions adapted under license by [employer] (which disclaims liability or warranty for this information). If you have questions about a medical condition or this instruction, always ask your healthcare professional. [address] any warranty or liability for your use of this information. Introducing [facility]! Arlene Hester introduces [employer] patient portal. Now you can access parts of your medical record, email your doctor's office, and request medication refills online. 1. In your internet browser, go to [email or web address] 2. Click on the First Time User? Click Here link in the Sign In box. You will see the New Member Sign Up page. 3. Enter your [employer] Access Code exactly as it appears below. You will not need to use this code after youve completed the sign-up process. If you do not sign up before the expiration date, you must request a new code. · [employer] Access Code: [email or web address] Expires: 10/21/2018  8:12 AM 
 
4. Enter the last four digits of your Social Security Number (xxxx) and Date of Birth (mm/dd/yyyy) as indicated and click Submit. You will be taken to the next sign-up page. 5. Create a Spring[employer] ID. This will be your [employer] login ID and cannot be changed, so think of one that is secure and easy to remember. 6. Create a [employer] password. You can change your password at any time. 7. Enter your Password Reset Question and Answer. This can be used at a later time if you forget your password. 8. Enter your e-mail address. You will receive e-mail notification when new information is available in [address] 9. Click Sign Up. You can now view and download portions of your medical record. 10. Click the Download Summary menu link to download a portable copy of your medical information.  
 
If you have questions, please visit the Frequently Asked Questions section of the [employer]. Remember, [employer] is NOT to be used for urgent needs. For medical emergencies, dial 911. Now available from your iPhone and Android! Please provide this summary of care documentation to your next provider. Your primary care clinician is listed as Teo [address]. If you have any questions after today's visit, please call [phone].

## 2018-08-20 NOTE — PROGRESS NOTES
Royal Cline is a 77 y.o. female (: 1950) presenting to address:    Chief Complaint   Patient presents with    Hypertension       Vitals:    18 0828   Resp: 20   Weight: 208 lb 9.6 oz (94.6 kg)   Height: 5' 7\" (1.702 m)   PainSc:   0 - No pain       Hearing/Vision:   No exam data present    Learning Assessment:     Learning Assessment 10/2/2014   PRIMARY LEARNER Patient   HIGHEST LEVEL OF EDUCATION - PRIMARY LEARNER  4 YEARS OF COLLEGE   BARRIERS PRIMARY LEARNER NONE   PRIMARY LANGUAGE ENGLISH   LEARNER PREFERENCE PRIMARY LISTENING   ANSWERED BY pt   RELATIONSHIP OTHER     Depression Screening:     PHQ over the last two weeks 2018   Little interest or pleasure in doing things Not at all   Feeling down, depressed, irritable, or hopeless Not at all   Total Score PHQ 2 0     Fall Risk Assessment:     Fall Risk Assessment, last 12 mths 2018   Able to walk? Yes   Fall in past 12 months? Yes   Fall with injury? No   Number of falls in past 12 months 1   Fall Risk Score 1     Abuse Screening:     Abuse Screening Questionnaire 3/21/2018   Do you ever feel afraid of your partner? N   Are you in a relationship with someone who physically or mentally threatens you? N   Is it safe for you to go home? Y     Coordination of Care Questionaire:   1. Have you been to the ER, urgent care clinic since your last visit? Hospitalized since your last visit? NO    2. Have you seen or consulted any other health care providers outside of the [facility] since your last visit? Include any pap smears or colon screening. NO    Advanced Directive:   1. Do you have an Advanced Directive? NO    2. Would you like information on Advanced Directives?  NO

## 2018-12-17 ENCOUNTER — OFFICE VISIT (OUTPATIENT)
Dept: FAMILY MEDICINE CLINIC | Age: 68
End: 2018-12-17

## 2018-12-17 VITALS
HEIGHT: 67 IN | TEMPERATURE: 98.1 F | OXYGEN SATURATION: 98 % | RESPIRATION RATE: 20 BRPM | DIASTOLIC BLOOD PRESSURE: 90 MMHG | SYSTOLIC BLOOD PRESSURE: 120 MMHG | BODY MASS INDEX: 32.96 KG/M2 | HEART RATE: 96 BPM | WEIGHT: 210 LBS

## 2018-12-17 DIAGNOSIS — E55.9 HYPOVITAMINOSIS D: ICD-10-CM

## 2018-12-17 DIAGNOSIS — Z00.00 ANNUAL PHYSICAL EXAM: ICD-10-CM

## 2018-12-17 DIAGNOSIS — E78.00 PURE HYPERCHOLESTEROLEMIA: ICD-10-CM

## 2018-12-17 DIAGNOSIS — I10 ESSENTIAL HYPERTENSION: Primary | ICD-10-CM

## 2018-12-17 NOTE — PROGRESS NOTES
Assessment/Plan:    *Diagnoses and all orders for this visit:    1. Essential hypertension    2. Pure hypercholesterolemia  -     CBC WITH AUTOMATED DIFF; Future  -     HEPATIC FUNCTION PANEL; Future  -     LIPID PANEL; Future  -     METABOLIC PANEL, BASIC; Future  -     TSH 3RD GENERATION; Future  -     T4, FREE; Future  -     URINALYSIS W/ RFLX MICROSCOPIC; Future    3. Annual physical exam    4. Hypovitaminosis D  -     VITAMIN D, 25 HYDROXY; Future        Physical in 2019. Labs prior. The plan was discussed with the patient. The patient verbalized understanding and is in agreement with the plan. All medication potential side effects were discussed with the patient.    -------------------------------------------------------------------------------------------------------------------        Sean Man is a 77 y.o. female and presents with Hypertension and Cholesterol Problem         Subjective:  Pt here for f/u. HTN: well controlled. HLD: stable on last BW. ROS:  Review of Systems - Negative         The problem list was updated as a part of today's visit. Patient Active Problem List   Diagnosis Code    HLD (hyperlipidemia) E78.5    GERD (gastroesophageal reflux disease) K21.9    Sciatica of left side M54.32    Osteoarthritis of both knees M17.0    Vitamin D deficiency E55.9    Advance directive discussed with patient Z70.80    Chronic left-sided low back pain with sciatica M54.40, G89.29    Essential hypertension I10       The PSH, FH were reviewed.         SH:  Social History     Tobacco Use    Smoking status: Former Smoker     Packs/day: 1.00     Last attempt to quit: 1993     Years since quittin.3    Smokeless tobacco: Never Used   Substance Use Topics    Alcohol use: Yes     Comment: once a month, 1 mixed drink    Drug use: No       Medications/Allergies:  Current Outpatient Medications on File Prior to Visit   Medication Sig Dispense Refill    amLODIPine (NORVASC) 2.5 mg tablet Take 1 Tab by mouth daily. 90 Tab 1    cholecalciferol (VITAMIN D3) 1,000 unit cap Take 4,000 Units by mouth daily.  omeprazole (PRILOSEC) 20 mg capsule Take 1 Cap by mouth daily. 90 Cap 2    atorvastatin (LIPITOR) 20 mg tablet Take 1 Tab by mouth daily. 90 Tab 2    Blood Pressure Monitor (BLOOD PRESSURE KIT) kit For daily use. Dx: I99.8 1 Kit 0    coenzyme q10 10 mg cap Take  by mouth. No current facility-administered medications on file prior to visit. No Known Allergies      Health Maintenance:   Health Maintenance   Topic Date Due    Shingrix Vaccine Age 49> (1 of 2) 04/24/1998    Pneumococcal 65+ Low/Medium Risk (2 of 2 - PPSV23) 10/13/2017    GLAUCOMA SCREENING Q2Y  01/12/2019    Influenza Age 9 to Adult  05/25/2023 (Originally 8/1/2018)    MEDICARE YEARLY EXAM  03/22/2019    BREAST CANCER SCRN MAMMOGRAM  04/20/2020    COLONOSCOPY  12/31/2024    DTaP/Tdap/Td series (2 - Td) 10/13/2026    Hepatitis C Screening  Completed    Bone Densitometry (Dexa) Screening  Completed       Objective:  Visit Vitals  /90 (BP 1 Location: Left arm, BP Patient Position: Sitting)   Pulse 96   Temp 98.1 °F (36.7 °C) (Oral)   Resp 20   Ht 5' 7\" (1.702 m)   Wt 210 lb (95.3 kg)   SpO2 98%   BMI 32.89 kg/m²          Nurses notes and VS reviewed.       Physical Examination: General appearance - alert, well appearing, and in no distress  Chest - clear to auscultation, no wheezes, rales or rhonchi, symmetric air entry  Heart - normal rate, regular rhythm, normal S1, S2, no murmurs, rubs, clicks or gallops          Labwork and Ancillary Studies:    CBC w/Diff  Lab Results   Component Value Date/Time    WBC 5.3 03/09/2018 12:00 AM    HGB 13.4 03/09/2018 12:00 AM    PLATELET 057 (H) 12/62/9110 12:00 AM         Basic Metabolic Profile  Lab Results   Component Value Date/Time    Sodium 143 03/09/2018 12:00 AM    Potassium 4.2 03/09/2018 12:00 AM    Chloride 102 03/09/2018 12:00 AM CO2 24 03/09/2018 12:00 AM    Anion gap 7 03/08/2016 12:00 PM    Glucose 89 03/09/2018 12:00 AM    BUN 8 03/09/2018 12:00 AM    Creatinine 0.70 03/09/2018 12:00 AM    BUN/Creatinine ratio 11 (L) 03/09/2018 12:00 AM    GFR est  03/09/2018 12:00 AM    GFR est non-AA 89 03/09/2018 12:00 AM    Calcium 9.4 03/09/2018 12:00 AM         LFT  Lab Results   Component Value Date/Time    ALT (SGPT) 12 03/09/2018 12:00 AM    AST (SGOT) 15 03/09/2018 12:00 AM    Alk.  phosphatase 78 03/09/2018 12:00 AM    Bilirubin, direct 0.17 03/09/2018 12:00 AM    Bilirubin, total 0.7 03/09/2018 12:00 AM         Cholesterol  Lab Results   Component Value Date/Time    Cholesterol, total 198 03/09/2018 12:00 AM    HDL Cholesterol 72 03/09/2018 12:00 AM    LDL, calculated 111 (H) 03/09/2018 12:00 AM    Triglyceride 75 03/09/2018 12:00 AM    CHOL/HDL Ratio 3.8 04/04/2016 08:32 AM

## 2018-12-17 NOTE — PROGRESS NOTES
Nadira Granado is a 77 y.o. female (: 1950) presenting to address:    Chief Complaint   Patient presents with    Hypertension    Cholesterol Problem       Vitals:    18 0750   BP: 120/90   Pulse: 96   Resp: 20   Temp: 98.1 °F (36.7 °C)   TempSrc: Oral   SpO2: 98%   Weight: 210 lb (95.3 kg)   Height: 5' 7\" (1.702 m)   PainSc:   0 - No pain       Learning Assessment:     Learning Assessment 10/2/2014   PRIMARY LEARNER Patient   HIGHEST LEVEL OF EDUCATION - PRIMARY LEARNER  4 YEARS OF COLLEGE   BARRIERS PRIMARY LEARNER NONE   PRIMARY LANGUAGE ENGLISH   LEARNER PREFERENCE PRIMARY LISTENING   ANSWERED BY pt   RELATIONSHIP OTHER     Depression Screening:     PHQ over the last two weeks 2018   Little interest or pleasure in doing things Not at all   Feeling down, depressed, irritable, or hopeless Not at all   Total Score PHQ 2 0     Fall Risk Assessment:     Fall Risk Assessment, last 12 mths 2018   Able to walk? Yes   Fall in past 12 months? Yes   Fall with injury? Yes   Number of falls in past 12 months 1   Fall Risk Score 2     Abuse Screening:     Abuse Screening Questionnaire 3/21/2018   Do you ever feel afraid of your partner? N   Are you in a relationship with someone who physically or mentally threatens you? N   Is it safe for you to go home? Y     Coordination of Care Questionaire:   1. Have you been to the ER, urgent care clinic since your last visit? Hospitalized since your last visit? NO    2. Have you seen or consulted any other health care providers outside of the [address] [place] [address] since your last visit? Include any pap smears or colon screening. YES gyn 18    Advanced Directive:   1. Do you have an Advanced Directive? YES    2. Would you like information on Advanced Directives?  NO

## 2018-12-17 NOTE — PATIENT INSTRUCTIONS
High Blood Pressure: Care Instructions  Your Care Instructions    If your blood pressure is usually above 130/80, you have high blood pressure, or hypertension. That means the top number is 130 or higher or the bottom number is 80 or higher, or both. Despite what a lot of people think, high blood pressure usually doesn't cause headaches or make you feel dizzy or lightheaded. It usually has no symptoms. But it does increase your risk for heart attack, stroke, and kidney or eye damage. The higher your blood pressure, the more your risk increases. Your doctor will give you a goal for your blood pressure. Your goal will be based on your health and your age. Lifestyle changes, such as eating healthy and being active, are always important to help lower blood pressure. You might also take medicine to reach your blood pressure goal.  Follow-up care is a key part of your treatment and safety. Be sure to make and go to all appointments, and call your doctor if you are having problems. It's also a good idea to know your test results and keep a list of the medicines you take. How can you care for yourself at home? Medical treatment  · If you stop taking your medicine, your blood pressure will go back up. You may take one or more types of medicine to lower your blood pressure. Be safe with medicines. Take your medicine exactly as prescribed. Call your doctor if you think you are having a problem with your medicine. · Talk to your doctor before you start taking aspirin every day. Aspirin can help certain people lower their risk of a heart attack or stroke. But taking aspirin isn't right for everyone, because it can cause serious bleeding. · See your doctor regularly. You may need to see the doctor more often at first or until your blood pressure comes down. · If you are taking blood pressure medicine, talk to your doctor before you take decongestants or anti-inflammatory medicine, such as ibuprofen.  Some of these medicines can raise blood pressure. · Learn how to check your blood pressure at home. Lifestyle changes  · Stay at a healthy weight. This is especially important if you put on weight around the waist. Losing even 10 pounds can help you lower your blood pressure. · If your doctor recommends it, get more exercise. Walking is a good choice. Bit by bit, increase the amount you walk every day. Try for at least 30 minutes on most days of the week. You also may want to swim, bike, or do other activities. · Avoid or limit alcohol. Talk to your doctor about whether you can drink any alcohol. · Try to limit how much sodium you eat to less than 2,300 milligrams (mg) a day. Your doctor may ask you to try to eat less than 1,500 mg a day. · Eat plenty of fruits (such as bananas and oranges), vegetables, legumes, whole grains, and low-fat dairy products. · Lower the amount of saturated fat in your diet. Saturated fat is found in animal products such as milk, cheese, and meat. Limiting these foods may help you lose weight and also lower your risk for heart disease. · Do not smoke. Smoking increases your risk for heart attack and stroke. If you need help quitting, talk to your doctor about stop-smoking programs and medicines. These can increase your chances of quitting for good. When should you call for help? Call 911 anytime you think you may need emergency care. This may mean having symptoms that suggest that your blood pressure is causing a serious heart or blood vessel problem. Your blood pressure may be over 180/120.   For example, call 911 if:    · You have symptoms of a heart attack. These may include:  ? Chest pain or pressure, or a strange feeling in the chest.  ? Sweating. ? Shortness of breath. ? Nausea or vomiting. ? Pain, pressure, or a strange feeling in the back, neck, jaw, or upper belly or in one or both shoulders or arms. ? Lightheadedness or sudden weakness.   ? A fast or irregular heartbeat.     · You have symptoms of a stroke. These may include:  ? Sudden numbness, tingling, weakness, or loss of movement in your face, arm, or leg, especially on only one side of your body. ? Sudden vision changes. ? Sudden trouble speaking. ? Sudden confusion or trouble understanding simple statements. ? Sudden problems with walking or balance. ? A sudden, severe headache that is different from past headaches.     · You have severe back or belly pain.    Do not wait until your blood pressure comes down on its own. Get help right away.   Call your doctor now or seek immediate care if:    · Your blood pressure is much higher than normal (such as 180/120 or higher), but you don't have symptoms.     · You think high blood pressure is causing symptoms, such as:  ? Severe headache.  ? Blurry vision.    Watch closely for changes in your health, and be sure to contact your doctor if:    · Your blood pressure measures higher than your doctor recommends at least 2 times. That means the top number is higher or the bottom number is higher, or both.     · You think you may be having side effects from your blood pressure medicine. Where can you learn more? Go to http://stephanie-mariella.info/. Enter G405 in the search box to learn more about \"High Blood Pressure: Care Instructions. \"  Current as of: December 6, 2017  Content Version: 11.8  © 0453-5553 [employer]. Care instructions adapted under license by [employer] (which disclaims liability or warranty for this information). If you have questions about a medical condition or this instruction, always ask your healthcare professional. [place] [address] any warranty or liability for your use of this information.

## 2019-03-08 LAB
25(OH)D3+25(OH)D2 SERPL-MCNC: 48.2 NG/ML (ref 30–100)
ALBUMIN SERPL-MCNC: 4 G/DL (ref 3.5–4.8)
ALP SERPL-CCNC: 78 IU/L (ref 39–117)
ALT SERPL-CCNC: 13 IU/L (ref 0–32)
APPEARANCE UR: CLEAR
AST SERPL-CCNC: 12 IU/L (ref 0–40)
BASOPHILS # BLD AUTO: 0 X10E3/UL (ref 0–0.2)
BASOPHILS NFR BLD AUTO: 1 %
BILIRUB DIRECT SERPL-MCNC: 0.15 MG/DL (ref 0–0.4)
BILIRUB SERPL-MCNC: 0.5 MG/DL (ref 0–1.2)
BILIRUB UR QL STRIP: NEGATIVE
BUN SERPL-MCNC: 10 MG/DL (ref 8–27)
BUN/CREAT SERPL: 14 (ref 12–28)
CALCIUM SERPL-MCNC: 9.5 MG/DL (ref 8.7–10.3)
CHLORIDE SERPL-SCNC: 107 MMOL/L (ref 96–106)
CHOLEST SERPL-MCNC: 185 MG/DL (ref 100–199)
CO2 SERPL-SCNC: 27 MMOL/L (ref 20–29)
COLOR UR: YELLOW
CREAT SERPL-MCNC: 0.69 MG/DL (ref 0.57–1)
EOSINOPHIL # BLD AUTO: 0.1 X10E3/UL (ref 0–0.4)
EOSINOPHIL NFR BLD AUTO: 2 %
ERYTHROCYTE [DISTWIDTH] IN BLOOD BY AUTOMATED COUNT: 14.3 % (ref 12.3–15.4)
GLUCOSE SERPL-MCNC: 90 MG/DL (ref 65–99)
GLUCOSE UR QL: NEGATIVE
HCT VFR BLD AUTO: 40.2 % (ref 34–46.6)
HDLC SERPL-MCNC: 72 MG/DL
HGB BLD-MCNC: 13.7 G/DL (ref 11.1–15.9)
HGB UR QL STRIP: NEGATIVE
IMM GRANULOCYTES # BLD AUTO: 0 X10E3/UL (ref 0–0.1)
IMM GRANULOCYTES NFR BLD AUTO: 0 %
INTERPRETATION, 910389: NORMAL
KETONES UR QL STRIP: NEGATIVE
LDLC SERPL CALC-MCNC: 102 MG/DL (ref 0–99)
LEUKOCYTE ESTERASE UR QL STRIP: NEGATIVE
LYMPHOCYTES # BLD AUTO: 1.4 X10E3/UL (ref 0.7–3.1)
LYMPHOCYTES NFR BLD AUTO: 31 %
MCH RBC QN AUTO: 27.6 PG (ref 26.6–33)
MCHC RBC AUTO-ENTMCNC: 34.1 G/DL (ref 31.5–35.7)
MCV RBC AUTO: 81 FL (ref 79–97)
MICRO URNS: NORMAL
MONOCYTES # BLD AUTO: 0.4 X10E3/UL (ref 0.1–0.9)
MONOCYTES NFR BLD AUTO: 9 %
NEUTROPHILS # BLD AUTO: 2.6 X10E3/UL (ref 1.4–7)
NEUTROPHILS NFR BLD AUTO: 57 %
NITRITE UR QL STRIP: NEGATIVE
PH UR STRIP: 5.5 [PH] (ref 5–7.5)
PLATELET # BLD AUTO: 304 X10E3/UL (ref 150–379)
POTASSIUM SERPL-SCNC: 4.1 MMOL/L (ref 3.5–5.2)
PROT SERPL-MCNC: 6.7 G/DL (ref 6–8.5)
PROT UR QL STRIP: NEGATIVE
RBC # BLD AUTO: 4.97 X10E6/UL (ref 3.77–5.28)
SODIUM SERPL-SCNC: 145 MMOL/L (ref 134–144)
SP GR UR: 1.01 (ref 1–1.03)
T4 FREE SERPL-MCNC: 1.43 NG/DL (ref 0.82–1.77)
TRIGL SERPL-MCNC: 56 MG/DL (ref 0–149)
TSH SERPL DL<=0.005 MIU/L-ACNC: 0.85 UIU/ML (ref 0.45–4.5)
UROBILINOGEN UR STRIP-MCNC: 0.2 MG/DL (ref 0.2–1)
VLDLC SERPL CALC-MCNC: 11 MG/DL (ref 5–40)
WBC # BLD AUTO: 4.4 X10E3/UL (ref 3.4–10.8)

## 2019-03-10 RX ORDER — AMLODIPINE BESYLATE 2.5 MG/1
TABLET ORAL
Qty: 90 TAB | Refills: 0 | Status: SHIPPED | OUTPATIENT
Start: 2019-03-10 | End: 2020-03-05

## 2019-03-12 ENCOUNTER — OFFICE VISIT (OUTPATIENT)
Dept: FAMILY MEDICINE CLINIC | Age: 69
End: 2019-03-12

## 2019-03-12 VITALS
OXYGEN SATURATION: 97 % | TEMPERATURE: 98 F | RESPIRATION RATE: 20 BRPM | BODY MASS INDEX: 32.33 KG/M2 | SYSTOLIC BLOOD PRESSURE: 126 MMHG | WEIGHT: 206 LBS | HEIGHT: 67 IN | HEART RATE: 83 BPM | DIASTOLIC BLOOD PRESSURE: 88 MMHG

## 2019-03-12 DIAGNOSIS — I10 ESSENTIAL HYPERTENSION: Primary | ICD-10-CM

## 2019-03-12 DIAGNOSIS — E55.9 VITAMIN D DEFICIENCY: ICD-10-CM

## 2019-03-12 DIAGNOSIS — Z78.0 POSTMENOPAUSAL: ICD-10-CM

## 2019-03-12 DIAGNOSIS — E78.00 PURE HYPERCHOLESTEROLEMIA: ICD-10-CM

## 2019-03-12 DIAGNOSIS — Z00.00 MEDICARE ANNUAL WELLNESS VISIT, SUBSEQUENT: ICD-10-CM

## 2019-03-12 DIAGNOSIS — Z12.39 BREAST CANCER SCREENING: ICD-10-CM

## 2019-03-12 NOTE — PATIENT INSTRUCTIONS
Medicare Wellness Visit, Female     The best way to live healthy is to have a lifestyle where you eat a well-balanced diet, exercise regularly, limit alcohol use, and quit all forms of tobacco/nicotine, if applicable. Regular preventive services are another way to keep healthy. Preventive services (vaccines, screening tests, monitoring & exams) can help personalize your care plan, which helps you manage your own care. Screening tests can find health problems at the earliest stages, when they are easiest to treat. [facility] follows the current, evidence-based guidelines published by the [facility] ([facility]) when recommending preventive services for our patients. Because we follow these guidelines, sometimes recommendations change over time as research supports it. (For example, mammograms used to be recommended annually. Even though Medicare will still pay for an annual mammogram, the newer guidelines recommend a mammogram every two years for women of average risk.)  Of course, you and your doctor may decide to screen more often for some diseases, based on your risk and your health status. Preventive services for you include:  - Medicare offers their members a free annual wellness visit, which is time for you and your primary care provider to discuss and plan for your preventive service needs. Take advantage of this benefit every year!  -All adults over the age of 72 should receive the recommended pneumonia vaccines. Current USPSTF guidelines recommend a series of two vaccines for the best pneumonia protection.   -All adults should have a flu vaccine yearly and a tetanus vaccine every 10 years. All adults age 61 and older should receive a shingles vaccine once in their lifetime.    -A bone mass density test is recommended when a woman turns 65 to screen for osteoporosis. This test is only recommended one time, as a screening.  Some providers will use this same test as a disease monitoring tool if you already have osteoporosis. -All adults age 38-68 who are overweight should have a diabetes screening test once every three years.   -Other screening tests and preventive services for persons with diabetes include: an eye exam to screen for diabetic retinopathy, a kidney function test, a foot exam, and stricter control over your cholesterol.   -Cardiovascular screening for adults with routine risk involves an electrocardiogram (ECG) at intervals determined by your doctor.   -Colorectal cancer screenings should be done for adults age 54-65 with no increased risk factors for colorectal cancer. There are a number of acceptable methods of screening for this type of cancer. Each test has its own benefits and drawbacks. Discuss with your doctor what is most appropriate for you during your annual wellness visit. The different tests include: colonoscopy (considered the best screening method), a fecal occult blood test, a fecal DNA test, and sigmoidoscopy. -Breast cancer screenings are recommended every other year for women of normal risk, age 54-69.  -Cervical cancer screenings for women over age 72 are only recommended with certain risk factors.   -All adults born between [facility] should be screened once for Hepatitis C.      Here is a list of your current Health Maintenance items (your personalized list of preventive services) with a due date:  Health Maintenance Due   Topic Date Due    Shingles Vaccine (1 of 2) 04/24/1998    Pneumococcal Vaccine (2 of 2 - PPSV23) 10/13/2017    Annual Well Visit  03/22/2019

## 2019-03-12 NOTE — PROGRESS NOTES
Kia Galvez is a 77 y.o. female (: 1950) presenting to address:    Chief Complaint   Patient presents with    Hypertension    Cholesterol Problem    Results     patient here today to discuss lab results       Vitals:    19 0742   BP: 126/88   Pulse: 83   Resp: 20   Temp: 98 °F (36.7 °C)   TempSrc: Oral   SpO2: 97%   Weight: 206 lb (93.4 kg)   Height: 5' 7\" (1.702 m)   PainSc:   6   PainLoc: Generalized       Hearing/Vision:   No exam data present    Learning Assessment:     Learning Assessment 10/2/2014   PRIMARY LEARNER Patient   HIGHEST LEVEL OF EDUCATION - PRIMARY LEARNER  4 YEARS OF COLLEGE   BARRIERS PRIMARY LEARNER NONE   PRIMARY LANGUAGE ENGLISH   LEARNER PREFERENCE PRIMARY LISTENING   ANSWERED BY pt   RELATIONSHIP OTHER     Depression Screening:     3 most recent PHQ Screens 3/12/2019   Little interest or pleasure in doing things Not at all   Feeling down, depressed, irritable, or hopeless Not at all   Total Score PHQ 2 0     Fall Risk Assessment:     Fall Risk Assessment, last 12 mths 3/12/2019   Able to walk? Yes   Fall in past 12 months? No   Fall with injury? -   Number of falls in past 12 months -   Fall Risk Score -     Abuse Screening:     Abuse Screening Questionnaire 3/21/2018   Do you ever feel afraid of your partner? N   Are you in a relationship with someone who physically or mentally threatens you? N   Is it safe for you to go home? Y     Coordination of Care Questionaire:   1. Have you been to the ER, urgent care clinic since your last visit? Hospitalized since your last visit? NO    2. Have you seen or consulted any other health care providers outside of the [address] [place] [address] since your last visit? Include any pap smears or colon screening. Yes dentist    Advanced Directive:   1. Do you have an Advanced Directive? NO    2. Would you like information on Advanced Directives?  NO

## 2019-03-12 NOTE — PROGRESS NOTES
This is the Subsequent Medicare Annual Wellness Exam, performed 12 months or more after the Initial AWV or the last Subsequent AWV    I have reviewed the patient's medical history in detail and updated the computerized patient record. History     Past Medical History:   Diagnosis Date    Chicken pox     Chronic left-sided low back pain with sciatica 2017    Essential hypertension 2017    GERD (gastroesophageal reflux disease)     HLD (hyperlipidemia) 2015    Lumbar stenosis     Measles     Osteoarthritis of both knees 2015    Sciatica of left side     Shoulder pain     Left of back going up to the shoulder    Vitamin D deficiency       Past Surgical History:   Procedure Laterality Date    HX GYN  1980    Fibroid Removal    HX ORTHOPAEDIC Bilateral     Knee arthroscopy      Current Outpatient Medications   Medication Sig Dispense Refill    amLODIPine (NORVASC) 2.5 mg tablet TAKE ONE TABLET BY MOUTH ONE TIME DAILY  90 Tab 0    amLODIPine (NORVASC) 2.5 mg tablet TAKE ONE TABLET BY MOUTH ONE TIME DAILY  90 Tab 0    atorvastatin (LIPITOR) 20 mg tablet TAKE ONE TABLET BY MOUTH ONE TIME DAILY  90 Tab 0    cholecalciferol (VITAMIN D3) 1,000 unit cap Take 4,000 Units by mouth daily.  omeprazole (PRILOSEC) 20 mg capsule Take 1 Cap by mouth daily. 90 Cap 2    Blood Pressure Monitor (BLOOD PRESSURE KIT) kit For daily use. Dx: I99.8 1 Kit 0    coenzyme q10 10 mg cap Take  by mouth.        No Known Allergies  Family History   Problem Relation Age of Onset    Diabetes Mother      Social History     Tobacco Use    Smoking status: Former Smoker     Packs/day: 1.00     Last attempt to quit: 1993     Years since quittin.6    Smokeless tobacco: Never Used   Substance Use Topics    Alcohol use: Yes     Comment: once a month, 1 mixed drink     Patient Active Problem List   Diagnosis Code    HLD (hyperlipidemia) E78.5    GERD (gastroesophageal reflux disease) K21.9  Sciatica of left side M54.32    Osteoarthritis of both knees M17.0    Vitamin D deficiency E55.9    Advance directive discussed with patient Z70.80    Chronic left-sided low back pain with sciatica M54.40, G89.29    Essential hypertension I10       Depression Risk Factor Screening:     3 most recent PHQ Screens 3/12/2019   Little interest or pleasure in doing things Not at all   Feeling down, depressed, irritable, or hopeless Not at all   Total Score PHQ 2 0     Alcohol Risk Factor Screening: You do not drink alcohol or very rarely. Functional Ability and Level of Safety:   Hearing Loss  Hearing is good. Activities of Daily Living  The home contains: no safety equipment. Patient does total self care    Fall Risk  Fall Risk Assessment, last 12 mths 3/12/2019   Able to walk? Yes   Fall in past 12 months? No   Fall with injury? -   Number of falls in past 12 months -   Fall Risk Score -       Abuse Screen  Patient is not abused    Cognitive Screening   Evaluation of Cognitive Function:  Has your family/caregiver stated any concerns about your memory: no  Normal    Patient Care Team   Patient Care Team:  Dorina Espinosa MD as PCP - General (Internal Medicine)  Trinidad Barber MD (Surgery)  Bárbara Rodriguez MD (Obstetrics & Gynecology)  Darrel Pina MD (Ophthalmology)    Assessment/Plan   Education and counseling provided:  Are appropriate based on today's review and evaluation       Diagnoses and all orders for this visit:    1. Medicare annual wellness visit, subsequent    2. Breast cancer screening  -     HOSSEIN MAMMO BI SCREENING INCL CAD; Future    3. Postmenopausal  -     DEXA BONE DENSITY STUDY AXIAL; Future    4. Essential hypertension    5. Pure hypercholesterolemia    6.  Vitamin D deficiency        Health Maintenance Due   Topic Date Due    Shingrix Vaccine Age 49> (1 of 2) 04/24/1998    Pneumococcal 65+ Low/Medium Risk (2 of 2 - PPSV23) 10/13/2017    MEDICARE YEARLY EXAM  03/22/2019

## 2019-03-13 NOTE — PROGRESS NOTES
Assessment/Plan:    *Diagnoses and all orders for this visit:    1. Essential hypertension    2. Breast cancer screening  -     HOSSEIN MAMMO BI SCREENING INCL CAD; Future    3. Postmenopausal  -     DEXA BONE DENSITY STUDY AXIAL; Future    4. Medicare annual wellness visit, subsequent    5. Pure hypercholesterolemia    6. Vitamin D deficiency        Routine f/u 4 months. The plan was discussed with the patient. The patient verbalized understanding and is in agreement with the plan. All medication potential side effects were discussed with the patient.    -------------------------------------------------------------------------------------------------------------------        Erick Cazares is a 77 y.o. female and presents with Hypertension; Annual Wellness Visit; and Results (patient here today to discuss lab results)         Subjective:  Pt here for routine f/u. No new complaints. HTN:  Well controlled. HLD:  Has improved, looking better. Vit d also is at goal.  Pt has been taking 4000 units total each day. ROS:  Review of Systems - Negative         The problem list was updated as a part of today's visit. Patient Active Problem List   Diagnosis Code    HLD (hyperlipidemia) E78.5    GERD (gastroesophageal reflux disease) K21.9    Sciatica of left side M54.32    Osteoarthritis of both knees M17.0    Vitamin D deficiency E55.9    Advance directive discussed with patient Z70.80    Chronic left-sided low back pain with sciatica M54.40, G89.29    Essential hypertension I10       The PSH, FH were reviewed.         SH:  Social History     Tobacco Use    Smoking status: Former Smoker     Packs/day: 1.00     Last attempt to quit: 1993     Years since quittin.6    Smokeless tobacco: Never Used   Substance Use Topics    Alcohol use: Yes     Comment: once a month, 1 mixed drink    Drug use: No       Medications/Allergies:  Current Outpatient Medications on File Prior to Visit   Medication Sig Dispense Refill    amLODIPine (NORVASC) 2.5 mg tablet TAKE ONE TABLET BY MOUTH ONE TIME DAILY  90 Tab 0    amLODIPine (NORVASC) 2.5 mg tablet TAKE ONE TABLET BY MOUTH ONE TIME DAILY  90 Tab 0    atorvastatin (LIPITOR) 20 mg tablet TAKE ONE TABLET BY MOUTH ONE TIME DAILY  90 Tab 0    cholecalciferol (VITAMIN D3) 1,000 unit cap Take 4,000 Units by mouth daily.  omeprazole (PRILOSEC) 20 mg capsule Take 1 Cap by mouth daily. 90 Cap 2    Blood Pressure Monitor (BLOOD PRESSURE KIT) kit For daily use. Dx: I99.8 1 Kit 0    coenzyme q10 10 mg cap Take  by mouth. No current facility-administered medications on file prior to visit. No Known Allergies      Health Maintenance:   Health Maintenance   Topic Date Due    Shingrix Vaccine Age 49> (1 of 2) 04/24/1998    Pneumococcal 65+ Low/Medium Risk (2 of 2 - PPSV23) 10/13/2017    MEDICARE YEARLY EXAM  03/22/2019    Influenza Age 9 to Adult  05/25/2023 (Originally 8/1/2018)    BREAST CANCER SCRN MAMMOGRAM  04/20/2020    GLAUCOMA SCREENING Q2Y  09/27/2020    COLONOSCOPY  12/31/2024    DTaP/Tdap/Td series (2 - Td) 10/13/2026    Hepatitis C Screening  Completed    Bone Densitometry (Dexa) Screening  Completed       Objective:  Visit Vitals  /88 (BP 1 Location: Left arm, BP Patient Position: Sitting)   Pulse 83   Temp 98 °F (36.7 °C) (Oral)   Resp 20   Ht 5' 7\" (1.702 m)   Wt 206 lb (93.4 kg)   SpO2 97%   BMI 32.26 kg/m²          Nurses notes and VS reviewed.       Physical Examination: General appearance - alert, well appearing, and in no distress  Ears - bilateral TM's and external ear canals normal  Mouth - mucous membranes moist, pharynx normal without lesions  Neck - supple, no significant adenopathy  Chest - clear to auscultation, no wheezes, rales or rhonchi, symmetric air entry  Heart - normal rate, regular rhythm, normal S1, S2, no murmurs, rubs, clicks or gallops  Abdomen - soft, nontender, nondistended, no masses or organomegaly  Musculoskeletal - no joint tenderness, deformity or swelling  Extremities - peripheral pulses normal, no pedal edema, no clubbing or cyanosis          Labwork and Ancillary Studies:    CBC w/Diff  Lab Results   Component Value Date/Time    WBC 4.4 03/07/2019 07:36 AM    HGB 13.7 03/07/2019 07:36 AM    PLATELET 898 78/40/9878 07:36 AM         Basic Metabolic Profile  Lab Results   Component Value Date/Time    Sodium 145 (H) 03/07/2019 07:36 AM    Potassium 4.1 03/07/2019 07:36 AM    Chloride 107 (H) 03/07/2019 07:36 AM    CO2 27 03/07/2019 07:36 AM    Anion gap 7 03/08/2016 12:00 PM    Glucose 90 03/07/2019 07:36 AM    BUN 10 03/07/2019 07:36 AM    Creatinine 0.69 03/07/2019 07:36 AM    BUN/Creatinine ratio 14 03/07/2019 07:36 AM    GFR est  03/07/2019 07:36 AM    GFR est non-AA 88 03/07/2019 07:36 AM    Calcium 9.5 03/07/2019 07:36 AM         LFT  Lab Results   Component Value Date/Time    ALT (SGPT) 13 03/07/2019 07:36 AM    AST (SGOT) 12 03/07/2019 07:36 AM    Alk.  phosphatase 78 03/07/2019 07:36 AM    Bilirubin, direct 0.15 03/07/2019 07:36 AM    Bilirubin, total 0.5 03/07/2019 07:36 AM         Cholesterol  Lab Results   Component Value Date/Time    Cholesterol, total 185 03/07/2019 07:36 AM    HDL Cholesterol 72 03/07/2019 07:36 AM    LDL, calculated 102 (H) 03/07/2019 07:36 AM    Triglyceride 56 03/07/2019 07:36 AM    CHOL/HDL Ratio 3.8 04/04/2016 08:32 AM

## 2019-03-29 DIAGNOSIS — Z78.0 POSTMENOPAUSAL: ICD-10-CM

## 2019-04-11 DIAGNOSIS — K21.9 GASTROESOPHAGEAL REFLUX DISEASE WITHOUT ESOPHAGITIS: ICD-10-CM

## 2019-04-11 RX ORDER — OMEPRAZOLE 20 MG/1
20 CAPSULE, DELAYED RELEASE ORAL DAILY
Qty: 90 CAP | Refills: 2 | Status: SHIPPED | OUTPATIENT
Start: 2019-04-11

## 2019-04-11 NOTE — TELEPHONE ENCOUNTER
Prilosec 20mg last ordered 03/21/18 with a quantity of 90 and 2 refills. Patient stated that she has not been feeling well because she has been out of the Prilosec. She declined to make an appointment at this time.     Patients last appointment 03/12/19  Upcoming appointment 07/11/19

## 2019-04-11 NOTE — TELEPHONE ENCOUNTER
Requested Prescriptions     Pending Prescriptions Disp Refills    omeprazole (PRILOSEC) 20 mg capsule 90 Cap 2     Sig: Take 1 Cap by mouth daily. Patient calling to request refill on: omeprazole      Remaining pills: 0  Last appt: 3/12/2019  Next appt: 4/11/2019    Pharmacy: [facility] pharmacy    Patient aware of 72 hour time frame.

## 2019-05-08 DIAGNOSIS — Z12.39 BREAST CANCER SCREENING: ICD-10-CM

## 2019-06-07 DIAGNOSIS — E78.2 MIXED HYPERLIPIDEMIA: ICD-10-CM

## 2019-06-07 NOTE — TELEPHONE ENCOUNTER
Requested Prescriptions     Pending Prescriptions Disp Refills    amLODIPine (NORVASC) 2.5 mg tablet 90 Tab 0    atorvastatin (LIPITOR) 20 mg tablet 90 Tab 0       Remaining pills:  Last appt:03/12/19  Next appt:07/11/19    Pharmacy:[facility] pharmacy      Patient aware of 72 hour time frame.

## 2019-06-08 RX ORDER — ATORVASTATIN CALCIUM 20 MG/1
TABLET, FILM COATED ORAL
Qty: 90 TAB | Refills: 1 | Status: SHIPPED | OUTPATIENT
Start: 2019-06-08 | End: 2019-12-10 | Stop reason: SDUPTHER

## 2019-06-08 RX ORDER — AMLODIPINE BESYLATE 2.5 MG/1
TABLET ORAL
Qty: 90 TAB | Refills: 1 | Status: SHIPPED | OUTPATIENT
Start: 2019-06-08 | End: 2019-07-11

## 2019-07-11 ENCOUNTER — OFFICE VISIT (OUTPATIENT)
Dept: FAMILY MEDICINE CLINIC | Age: 69
End: 2019-07-11

## 2019-07-11 VITALS
HEIGHT: 67 IN | SYSTOLIC BLOOD PRESSURE: 118 MMHG | HEART RATE: 80 BPM | WEIGHT: 204 LBS | RESPIRATION RATE: 16 BRPM | BODY MASS INDEX: 32.02 KG/M2 | TEMPERATURE: 97.4 F | OXYGEN SATURATION: 95 % | DIASTOLIC BLOOD PRESSURE: 84 MMHG

## 2019-07-11 DIAGNOSIS — E78.00 PURE HYPERCHOLESTEROLEMIA: ICD-10-CM

## 2019-07-11 DIAGNOSIS — M54.40 CHRONIC LEFT-SIDED LOW BACK PAIN WITH SCIATICA, SCIATICA LATERALITY UNSPECIFIED: ICD-10-CM

## 2019-07-11 DIAGNOSIS — M17.0 PRIMARY OSTEOARTHRITIS OF BOTH KNEES: ICD-10-CM

## 2019-07-11 DIAGNOSIS — I10 ESSENTIAL HYPERTENSION: Primary | ICD-10-CM

## 2019-07-11 DIAGNOSIS — G89.29 CHRONIC LEFT-SIDED LOW BACK PAIN WITH SCIATICA, SCIATICA LATERALITY UNSPECIFIED: ICD-10-CM

## 2019-07-11 RX ORDER — CYCLOSPORINE 0.5 MG/ML
1 EMULSION OPHTHALMIC EVERY 12 HOURS
COMMUNITY

## 2019-07-11 NOTE — PROGRESS NOTES
Eva Cronin is a 68 y.o. female (: 1950) presenting to address:    Chief Complaint   Patient presents with    Hypertension       Vitals:    19 0737 19 0745   BP: (!) 140/92 134/90   Pulse: 80    Resp: 16    Temp: 97.4 °F (36.3 °C)    TempSrc: Oral    SpO2: 95%    Weight: 204 lb (92.5 kg)    Height: 5' 7\" (1.702 m)    PainSc:   6    PainLoc: Buttocks        Hearing/Vision:   No exam data present    Learning Assessment:     Learning Assessment 10/2/2014   PRIMARY LEARNER Patient   HIGHEST LEVEL OF EDUCATION - PRIMARY LEARNER  4 YEARS OF COLLEGE   BARRIERS PRIMARY LEARNER NONE   PRIMARY LANGUAGE ENGLISH   LEARNER PREFERENCE PRIMARY LISTENING   ANSWERED BY pt   RELATIONSHIP OTHER     Depression Screening:     3 most recent PHQ Screens 2019   Little interest or pleasure in doing things Not at all   Feeling down, depressed, irritable, or hopeless Not at all   Total Score PHQ 2 0     Fall Risk Assessment:     Fall Risk Assessment, last 12 mths 3/12/2019   Able to walk? Yes   Fall in past 12 months? No   Fall with injury? -   Number of falls in past 12 months -   Fall Risk Score -     Abuse Screening:     Abuse Screening Questionnaire 3/12/2019   Do you ever feel afraid of your partner? N   Are you in a relationship with someone who physically or mentally threatens you? N   Is it safe for you to go home? Y     Coordination of Care Questionaire:   1. Have you been to the ER, urgent care clinic since your last visit? Hospitalized since your last visit? NO    2. Have you seen or consulted any other health care providers outside of the [address] [place] [address] since your last visit? Include any pap smears or colon screening. NO    Advanced Directive:   1. Do you have an Advanced Directive? NO    2. Would you like information on Advanced Directives?  NO

## 2019-07-11 NOTE — PROGRESS NOTES
Assessment/Plan:    *Diagnoses and all orders for this visit:    1. Essential hypertension    2. Pure hypercholesterolemia  -     LIPID PANEL; Future  -     METABOLIC PANEL, BASIC; Future  -     HEPATIC FUNCTION PANEL; Future    3. Primary osteoarthritis of both knees    4. Chronic left-sided low back pain with sciatica, sciatica laterality unspecified        F/u 4 months. The plan was discussed with the patient. The patient verbalized understanding and is in agreement with the plan. All medication potential side effects were discussed with the patient.    -------------------------------------------------------------------------------------------------------------------        Edy Solis is a 68 y.o. female and presents with Hypertension         Subjective:  Pt here for f/u. HTN:  Well controlled, stable. HLD:  At goal.    Arthritis has been bothering her more, mainly her back which aggravates the sciatic pain. But, she manages. ROS:  Review of Systems - Negative except as above        The problem list was updated as a part of today's visit. Patient Active Problem List   Diagnosis Code    HLD (hyperlipidemia) E78.5    GERD (gastroesophageal reflux disease) K21.9    Sciatica of left side M54.32    Osteoarthritis of both knees M17.0    Vitamin D deficiency E55.9    Advance directive discussed with patient Z70.80    Chronic left-sided low back pain with sciatica M54.40, G89.29    Essential hypertension I10       The PSH, FH were reviewed.         SH:  Social History     Tobacco Use    Smoking status: Former Smoker     Packs/day: 1.00     Last attempt to quit: 1993     Years since quittin.9    Smokeless tobacco: Never Used   Substance Use Topics    Alcohol use: Yes     Comment: once a month, 1 mixed drink    Drug use: No       Medications/Allergies:  Current Outpatient Medications on File Prior to Visit   Medication Sig Dispense Refill    cycloSPORINE (RESTASIS) 0.05 % dpet Administer 1 Drop to both eyes every twelve (12) hours.  atorvastatin (LIPITOR) 20 mg tablet TAKE ONE TABLET BY MOUTH ONE TIME DAILY 90 Tab 1    omeprazole (PRILOSEC) 20 mg capsule Take 1 Cap by mouth daily. 90 Cap 2    amLODIPine (NORVASC) 2.5 mg tablet TAKE ONE TABLET BY MOUTH ONE TIME DAILY  90 Tab 0    cholecalciferol (VITAMIN D3) 1,000 unit cap Take 4,000 Units by mouth daily.  Blood Pressure Monitor (BLOOD PRESSURE KIT) kit For daily use. Dx: I99.8 1 Kit 0    coenzyme q10 10 mg cap Take  by mouth. No current facility-administered medications on file prior to visit. No Known Allergies      Health Maintenance:   Health Maintenance   Topic Date Due    Shingrix Vaccine Age 49> (1 of 2) 04/24/1998    Pneumococcal 65+ years (1 of 2 - PCV13) 04/24/2013    Influenza Age 5 to Adult  05/25/2023 (Originally 8/1/2019)    MEDICARE YEARLY EXAM  03/12/2020    GLAUCOMA SCREENING Q2Y  03/15/2021    BREAST CANCER SCRN MAMMOGRAM  04/25/2021    COLONOSCOPY  12/31/2024    DTaP/Tdap/Td series (2 - Td) 10/13/2026    Hepatitis C Screening  Completed    Bone Densitometry (Dexa) Screening  Completed       Objective:  Visit Vitals  /84   Pulse 80   Temp 97.4 °F (36.3 °C) (Oral)   Resp 16   Ht 5' 7\" (1.702 m)   Wt 204 lb (92.5 kg)   SpO2 95%   BMI 31.95 kg/m²          Nurses notes and VS reviewed.       Physical Examination: General appearance - alert, well appearing, and in no distress  Chest - clear to auscultation, no wheezes, rales or rhonchi, symmetric air entry  Heart - normal rate, regular rhythm, normal S1, S2, no murmurs, rubs, clicks or gallops          Labwork and Ancillary Studies:    CBC w/Diff  Lab Results   Component Value Date/Time    WBC 4.4 03/07/2019 07:36 AM    HGB 13.7 03/07/2019 07:36 AM    PLATELET 778 46/79/0782 07:36 AM         Basic Metabolic Profile  Lab Results   Component Value Date/Time    Sodium 145 (H) 03/07/2019 07:36 AM    Potassium 4.1 03/07/2019 07:36 AM Chloride 107 (H) 03/07/2019 07:36 AM    CO2 27 03/07/2019 07:36 AM    Anion gap 7 03/08/2016 12:00 PM    Glucose 90 03/07/2019 07:36 AM    BUN 10 03/07/2019 07:36 AM    Creatinine 0.69 03/07/2019 07:36 AM    BUN/Creatinine ratio 14 03/07/2019 07:36 AM    GFR est  03/07/2019 07:36 AM    GFR est non-AA 88 03/07/2019 07:36 AM    Calcium 9.5 03/07/2019 07:36 AM         LFT  Lab Results   Component Value Date/Time    ALT (SGPT) 13 03/07/2019 07:36 AM    AST (SGOT) 12 03/07/2019 07:36 AM    Alk.  phosphatase 78 03/07/2019 07:36 AM    Bilirubin, direct 0.15 03/07/2019 07:36 AM    Bilirubin, total 0.5 03/07/2019 07:36 AM         Cholesterol  Lab Results   Component Value Date/Time    Cholesterol, total 185 03/07/2019 07:36 AM    HDL Cholesterol 72 03/07/2019 07:36 AM    LDL, calculated 102 (H) 03/07/2019 07:36 AM    Triglyceride 56 03/07/2019 07:36 AM    CHOL/HDL Ratio 3.8 04/04/2016 08:32 AM

## 2019-07-11 NOTE — PATIENT INSTRUCTIONS
High Cholesterol: Care Instructions Your Care Instructions Cholesterol is a type of fat in your blood. It is needed for many body functions, such as making new cells. Cholesterol is made by your body. It also comes from food you eat. High cholesterol means that you have too much of the fat in your blood. This raises your risk of a heart attack and stroke. LDL and HDL are part of your total cholesterol. LDL is the \"bad\" cholesterol. High LDL can raise your risk for heart disease, heart attack, and stroke. HDL is the \"good\" cholesterol. It helps clear bad cholesterol from the body. High HDL is linked with a lower risk of heart disease, heart attack, and stroke. Your cholesterol levels help your doctor find out your risk for having a heart attack or stroke. You and your doctor can talk about whether you need to lower your risk and what treatment is best for you. A heart-healthy lifestyle along with medicines can help lower your cholesterol and your risk. The way you choose to lower your risk will depend on how high your risk is for heart attack and stroke. It will also depend on how you feel about taking medicines. Follow-up care is a key part of your treatment and safety. Be sure to make and go to all appointments, and call your doctor if you are having problems. It's also a good idea to know your test results and keep a list of the medicines you take. How can you care for yourself at home? · Eat a variety of foods every day. Good choices include fruits, vegetables, whole grains (like oatmeal), dried beans and peas, nuts and seeds, soy products (like tofu), and fat-free or low-fat dairy products. · Replace butter, margarine, and hydrogenated or partially hydrogenated oils with olive and canola oils. (Canola oil margarine without trans fat is fine.) · Replace red meat with fish, poultry, and soy protein (like tofu). · Limit processed and packaged foods like chips, crackers, and cookies. · Bake, broil, or steam foods. Don't dias them. · Be physically active. Get at least 30 minutes of exercise on most days of the week. Walking is a good choice. You also may want to do other activities, such as running, swimming, cycling, or playing tennis or team sports. · Stay at a healthy weight or lose weight by making the changes in eating and physical activity listed above. Losing just a small amount of weight, even 5 to 10 pounds, can reduce your risk for having a heart attack or stroke. · Do not smoke. When should you call for help? Watch closely for changes in your health, and be sure to contact your doctor if: 
  · You need help making lifestyle changes.  
  · You have questions about your medicine. Where can you learn more? Go to http://stephaniePixleemariella.info/. Enter F986 in the search box to learn more about \"High Cholesterol: Care Instructions. \" Current as of: July 22, 2018 Content Version: 11.9 © 7295-9250 [employer]. Care instructions adapted under license by [employer] (which disclaims liability or warranty for this information). If you have questions about a medical condition or this instruction, always ask your healthcare professional. [place] [address] any warranty or liability for your use of this information. Arthritis: Care Instructions Your Care Instructions Arthritis, also called osteoarthritis, is a breakdown of the cartilage that cushions your joints. When the cartilage wears down, your bones rub against each other. This causes pain and stiffness. Many people have some arthritis as they age. Arthritis most often affects the joints of the spine, hands, hips, knees, or feet. You can take simple measures to protect your joints, ease your pain, and help you stay active. Follow-up care is a key part of your treatment and safety.  Be sure to make and go to all appointments, and call your doctor if you are having problems. It's also a good idea to know your test results and keep a list of the medicines you take. How can you care for yourself at home? · Stay at a healthy weight. Being overweight puts extra strain on your joints. · Talk to your doctor or physical therapist about exercises that will help ease joint pain. ? Stretch. You may enjoy gentle forms of yoga to help keep your joints and muscles flexible. ? Walk instead of jog. Other types of exercise that are less stressful on the joints include riding a bicycle, swimming, william chi, or water exercise. ? Lift weights. Strong muscles help reduce stress on your joints. Stronger thigh muscles, for example, take some of the stress off of the knees and hips. Learn the right way to lift weights so you do not make joint pain worse. · Take your medicines exactly as prescribed. Call your doctor if you think you are having a problem with your medicine. · Take pain medicines exactly as directed. ? If the doctor gave you a prescription medicine for pain, take it as prescribed. ? If you are not taking a prescription pain medicine, ask your doctor if you can take an over-the-counter medicine. · Use a cane, crutch, walker, or another device if you need help to get around. These can help rest your joints. You also can use other things to make life easier, such as a higher toilet seat and padded handles on kitchen utensils. · Do not sit in low chairs, which can make it hard to get up. · Put heat or cold on your sore joints as needed. Use whichever helps you most. You also can take turns with hot and cold packs. ? Apply heat 2 or 3 times a day for 20 to 30 minutesusing a heating pad, hot shower, or hot packto relieve pain and stiffness. ? Put ice or a cold pack on your sore joint for 10 to 20 minutes at a time. Put a thin cloth between the ice and your skin. When should you call for help? Call your doctor now or seek immediate medical care if:   · You have sudden swelling, warmth, or pain in any joint.  
  · You have joint pain and a fever or rash.  
  · You have such bad pain that you cannot use a joint.  
 Watch closely for changes in your health, and be sure to contact your doctor if: 
  · You have mild joint symptoms that continue even with more than 6 weeks of care at home.  
  · You have stomach pain or other problems with your medicine. Where can you learn more? Go to http://stephanie-mariella.info/. Enter A472 in the search box to learn more about \"Arthritis: Care Instructions. \" Current as of: Ann Marie 10, 2018 Content Version: 11.9 © 3091-9751 [address]. Care instructions adapted under license by [employer] (which disclaims liability or warranty for this information). If you have questions about a medical condition or this instruction, always ask your healthcare professional. [address] any warranty or liability for your use of this information.

## 2019-08-12 NOTE — TELEPHONE ENCOUNTER
Call pt. Her Rx for BP monitor has been printed. of current unplanned pregnancy following Kristen-en-Y gastric bypass in June  2. PCOS  3. Obesity secondary to excess caloric intake  4. Anemia with current pregnancy  5. Elevated blood sugar in the setting of TPN and current pregnancy    Plan:   Case management/social work will be working on the acquisition of TPN while at home. The patient seems to have returned to her medical baseline. I have discussed the case with the surgical team.  There was an isolated elevated blood sugar identified yesterday and this will need to be monitored closely in the setting of TPN and current pregnancy. She will be provided a prescription for glucometer as well as testing to supplies to monitor her blood sugars before meals and at bedtime. She is certainly high risk and will need close outpatient follow-up by her OB/GYN team as well. No family members were present during my examination. If TPN can be arranged, the patient is acceptable for discharge home from internal medicine standpoint. Continue current therapy. See orders for further plan of care. More than 50% of my time was spent at the bedside counseling/coordinating care with the patient and/or family with face to face contact. This time was spent reviewing notes and laboratory data as well as instructing and counseling the patient. Time I spent with the family or surrogate(s) is included only if the patient was incapable of providing the necessary information or participating in medical decisions. I also discussed the differential diagnosis and all of the proposed management plans with the patient and individuals accompanying the patient. I am readily available for any further decision-making and intervention.        Kedar Braga DO, F.A.C.O.I.  8/12/2019  8:36 AM

## 2019-09-27 ENCOUNTER — OFFICE VISIT (OUTPATIENT)
Dept: FAMILY MEDICINE CLINIC | Age: 69
End: 2019-09-27

## 2019-09-27 VITALS
SYSTOLIC BLOOD PRESSURE: 101 MMHG | OXYGEN SATURATION: 97 % | WEIGHT: 203.8 LBS | BODY MASS INDEX: 31.99 KG/M2 | DIASTOLIC BLOOD PRESSURE: 78 MMHG | HEART RATE: 85 BPM | RESPIRATION RATE: 18 BRPM | TEMPERATURE: 97.8 F | HEIGHT: 67 IN

## 2019-09-27 DIAGNOSIS — R68.89 COLD INTOLERANCE: ICD-10-CM

## 2019-09-27 DIAGNOSIS — R63.8 OTHER SYMPTOMS AND SIGNS CONCERNING FOOD AND FLUID INTAKE: ICD-10-CM

## 2019-09-27 DIAGNOSIS — Z23 ENCOUNTER FOR IMMUNIZATION: ICD-10-CM

## 2019-09-27 DIAGNOSIS — R68.89 OTHER GENERAL SYMPTOMS AND SIGNS: Primary | ICD-10-CM

## 2019-09-27 NOTE — PROGRESS NOTES
Violeta Burger is a 68 y.o. female presents to office for cold intolerance.      Medication list has been reviewed with Violetacarli Burger and updated as of today's date     Health Maintenance items with a due date reviewed with patient:  Health Maintenance Due   Topic Date Due    Shingrix Vaccine Age 49> (1 of 2) 04/24/1998    Pneumococcal 65+ years (1 of 2 - PCV13) 04/24/2013

## 2019-09-27 NOTE — PROGRESS NOTES
Assessment/Plan:    *Diagnoses and all orders for this visit:    1. Other general symptoms and signs  -     CBC WITH AUTOMATED DIFF; Future  -     TSH 3RD GENERATION; Future  -     METABOLIC PANEL, BASIC; Future  -     T4, FREE; Future  -     IRON PROFILE; Future  -     FERRITIN; Future    2. Encounter for immunization  -     INFLUENZA VACCINE INACTIVATED (IIV), SUBUNIT, ADJUVANTED, IM  -     ADMIN INFLUENZA VIRUS VAC    3. Cold intolerance  -     CBC WITH AUTOMATED DIFF; Future  -     TSH 3RD GENERATION; Future  -     METABOLIC PANEL, BASIC; Future  -     T4, FREE; Future  -     IRON PROFILE; Future  -     FERRITIN; Future    4. Body mass index (BMI) of 30.0-30.9 in adult   -     CBC WITH AUTOMATED DIFF; Future    5. Other symptoms and signs concerning food and fluid intake   -     IRON PROFILE; Future  -     FERRITIN; Future        I spent 15 minutes with patient today and > 50% of the visit was dedicated to discussing and counseling the patient on symptoms and possible causes. The plan was discussed with the patient. The patient verbalized understanding and is in agreement with the plan. All medication potential side effects were discussed with the patient.    -------------------------------------------------------------------------------------------------------------------        Richard Leong is a 68 y.o. female and presents with Cold         Subjective:  Pt here for an issue with her legs. She has a well known issue with sciatic pain in both legs 2/2 her back. She has been having more issue with the LT leg, whenever she steps into a cold environment, air conditioned area. She feels her legs are \"cold\" and this makes her feel cold       ROS:  Review of Systems - Negative except as above        The problem list was updated as a part of today's visit.   Patient Active Problem List   Diagnosis Code    HLD (hyperlipidemia) E78.5    GERD (gastroesophageal reflux disease) K21.9    Sciatica of left side M54.32    Osteoarthritis of both knees M17.0    Vitamin D deficiency E55.9    Advance directive discussed with patient Z70.80    Chronic left-sided low back pain with sciatica M54.40, G89.29    Essential hypertension I10       The PSH, FH were reviewed. SH:  Social History     Tobacco Use    Smoking status: Former Smoker     Packs/day: 1.00     Last attempt to quit: 1993     Years since quittin.1    Smokeless tobacco: Never Used   Substance Use Topics    Alcohol use: Yes     Comment: once a month, 1 mixed drink    Drug use: No       Medications/Allergies:  Current Outpatient Medications on File Prior to Visit   Medication Sig Dispense Refill    cycloSPORINE (RESTASIS) 0.05 % dpet Administer 1 Drop to both eyes every twelve (12) hours.  atorvastatin (LIPITOR) 20 mg tablet TAKE ONE TABLET BY MOUTH ONE TIME DAILY 90 Tab 1    omeprazole (PRILOSEC) 20 mg capsule Take 1 Cap by mouth daily. 90 Cap 2    amLODIPine (NORVASC) 2.5 mg tablet TAKE ONE TABLET BY MOUTH ONE TIME DAILY  90 Tab 0    cholecalciferol (VITAMIN D3) 1,000 unit cap Take 4,000 Units by mouth daily.  Blood Pressure Monitor (BLOOD PRESSURE KIT) kit For daily use. Dx: I99.8 1 Kit 0    coenzyme q10 10 mg cap Take  by mouth. No current facility-administered medications on file prior to visit.          No Known Allergies      Health Maintenance:   Health Maintenance   Topic Date Due    Shingrix Vaccine Age 49> (1 of 2) 1998    Pneumococcal 65+ years (1 of 2 - PCV13) 2013    Influenza Age 9 to Adult  2023 (Originally 2019)    MEDICARE YEARLY EXAM  2020    GLAUCOMA SCREENING Q2Y  03/15/2021    BREAST CANCER SCRN MAMMOGRAM  2021    COLONOSCOPY  2024    DTaP/Tdap/Td series (2 - Td) 10/13/2026    Hepatitis C Screening  Completed    Bone Densitometry (Dexa) Screening  Completed       Objective:  Visit Vitals  /78 (BP 1 Location: Left arm, BP Patient Position: Sitting) Pulse 85   Temp 97.8 °F (36.6 °C) (Oral)   Resp 18   Ht 5' 7\" (1.702 m)   Wt 203 lb 12.8 oz (92.4 kg)   SpO2 97%   BMI 31.92 kg/m²          Nurses notes and VS reviewed. Physical Examination: General appearance - alert, well appearing, and in no distress          Labwork and Ancillary Studies:    CBC w/Diff  Lab Results   Component Value Date/Time    WBC 4.4 03/07/2019 07:36 AM    HGB 13.7 03/07/2019 07:36 AM    PLATELET 976 81/42/0118 07:36 AM         Basic Metabolic Profile  Lab Results   Component Value Date/Time    Sodium 145 (H) 03/07/2019 07:36 AM    Potassium 4.1 03/07/2019 07:36 AM    Chloride 107 (H) 03/07/2019 07:36 AM    CO2 27 03/07/2019 07:36 AM    Anion gap 7 03/08/2016 12:00 PM    Glucose 90 03/07/2019 07:36 AM    BUN 10 03/07/2019 07:36 AM    Creatinine 0.69 03/07/2019 07:36 AM    BUN/Creatinine ratio 14 03/07/2019 07:36 AM    GFR est  03/07/2019 07:36 AM    GFR est non-AA 88 03/07/2019 07:36 AM    Calcium 9.5 03/07/2019 07:36 AM         LFT  Lab Results   Component Value Date/Time    ALT (SGPT) 13 03/07/2019 07:36 AM    AST (SGOT) 12 03/07/2019 07:36 AM    Alk.  phosphatase 78 03/07/2019 07:36 AM    Bilirubin, direct 0.15 03/07/2019 07:36 AM    Bilirubin, total 0.5 03/07/2019 07:36 AM         Cholesterol  Lab Results   Component Value Date/Time    Cholesterol, total 185 03/07/2019 07:36 AM    HDL Cholesterol 72 03/07/2019 07:36 AM    LDL, calculated 102 (H) 03/07/2019 07:36 AM    Triglyceride 56 03/07/2019 07:36 AM    CHOL/HDL Ratio 3.8 04/04/2016 08:32 AM

## 2019-09-28 LAB
ALBUMIN SERPL-MCNC: 4.3 G/DL (ref 3.5–4.8)
ALP SERPL-CCNC: 91 IU/L (ref 39–117)
ALT SERPL-CCNC: 12 IU/L (ref 0–32)
AST SERPL-CCNC: 16 IU/L (ref 0–40)
BASOPHILS # BLD AUTO: 0.1 X10E3/UL (ref 0–0.2)
BASOPHILS NFR BLD AUTO: 1 %
BILIRUB DIRECT SERPL-MCNC: 0.21 MG/DL (ref 0–0.4)
BILIRUB SERPL-MCNC: 0.9 MG/DL (ref 0–1.2)
BUN SERPL-MCNC: 10 MG/DL (ref 8–27)
BUN/CREAT SERPL: 14 (ref 12–28)
CALCIUM SERPL-MCNC: 9.8 MG/DL (ref 8.7–10.3)
CHLORIDE SERPL-SCNC: 103 MMOL/L (ref 96–106)
CHOLEST SERPL-MCNC: 193 MG/DL (ref 100–199)
CO2 SERPL-SCNC: 23 MMOL/L (ref 20–29)
CREAT SERPL-MCNC: 0.7 MG/DL (ref 0.57–1)
EOSINOPHIL # BLD AUTO: 0.1 X10E3/UL (ref 0–0.4)
EOSINOPHIL NFR BLD AUTO: 2 %
ERYTHROCYTE [DISTWIDTH] IN BLOOD BY AUTOMATED COUNT: 13.2 % (ref 12.3–15.4)
FERRITIN SERPL-MCNC: 155 NG/ML (ref 15–150)
GLUCOSE SERPL-MCNC: 83 MG/DL (ref 65–99)
HCT VFR BLD AUTO: 45.1 % (ref 34–46.6)
HDLC SERPL-MCNC: 86 MG/DL
HGB BLD-MCNC: 14.5 G/DL (ref 11.1–15.9)
IMM GRANULOCYTES # BLD AUTO: 0 X10E3/UL (ref 0–0.1)
IMM GRANULOCYTES NFR BLD AUTO: 0 %
INTERPRETATION, 910389: NORMAL
IRON SATN MFR SERPL: 44 % (ref 15–55)
IRON SERPL-MCNC: 109 UG/DL (ref 27–139)
LDLC SERPL CALC-MCNC: 93 MG/DL (ref 0–99)
LYMPHOCYTES # BLD AUTO: 1.3 X10E3/UL (ref 0.7–3.1)
LYMPHOCYTES NFR BLD AUTO: 26 %
MCH RBC QN AUTO: 25.9 PG (ref 26.6–33)
MCHC RBC AUTO-ENTMCNC: 32.2 G/DL (ref 31.5–35.7)
MCV RBC AUTO: 81 FL (ref 79–97)
MONOCYTES # BLD AUTO: 0.5 X10E3/UL (ref 0.1–0.9)
MONOCYTES NFR BLD AUTO: 9 %
NEUTROPHILS # BLD AUTO: 3.2 X10E3/UL (ref 1.4–7)
NEUTROPHILS NFR BLD AUTO: 62 %
PLATELET # BLD AUTO: 317 X10E3/UL (ref 150–450)
POTASSIUM SERPL-SCNC: 4.4 MMOL/L (ref 3.5–5.2)
PROT SERPL-MCNC: 7 G/DL (ref 6–8.5)
RBC # BLD AUTO: 5.6 X10E6/UL (ref 3.77–5.28)
SODIUM SERPL-SCNC: 143 MMOL/L (ref 134–144)
T4 FREE SERPL-MCNC: 1.37 NG/DL (ref 0.82–1.77)
TIBC SERPL-MCNC: 249 UG/DL (ref 250–450)
TRIGL SERPL-MCNC: 69 MG/DL (ref 0–149)
TSH SERPL DL<=0.005 MIU/L-ACNC: 0.75 UIU/ML (ref 0.45–4.5)
UIBC SERPL-MCNC: 140 UG/DL (ref 118–369)
VLDLC SERPL CALC-MCNC: 14 MG/DL (ref 5–40)
WBC # BLD AUTO: 5.1 X10E3/UL (ref 3.4–10.8)

## 2019-10-04 ENCOUNTER — TELEPHONE (OUTPATIENT)
Dept: FAMILY MEDICINE CLINIC | Age: 69
End: 2019-10-04

## 2019-10-04 NOTE — TELEPHONE ENCOUNTER
Patient called and states she never heard about her results from a recent testing (didn't specify) please advise

## 2019-11-13 ENCOUNTER — TELEPHONE (OUTPATIENT)
Dept: FAMILY MEDICINE CLINIC | Age: 69
End: 2019-11-13

## 2019-11-13 DIAGNOSIS — E55.9 VITAMIN D DEFICIENCY: Primary | ICD-10-CM

## 2019-11-13 DIAGNOSIS — E78.00 PURE HYPERCHOLESTEROLEMIA: ICD-10-CM

## 2019-11-13 NOTE — TELEPHONE ENCOUNTER
Called the patient, and she stated that she got a printed copy of the letter that Dr. Neto Nieves had sent her on Oct. 1st, so she does not need anything further at this time.

## 2020-03-14 LAB
ALBUMIN SERPL-MCNC: 4.4 G/DL (ref 3.7–4.7)
ALP SERPL-CCNC: 78 IU/L (ref 39–117)
ALT SERPL-CCNC: 13 IU/L (ref 0–32)
APPEARANCE UR: CLEAR
AST SERPL-CCNC: 15 IU/L (ref 0–40)
BASOPHILS # BLD AUTO: 0.1 X10E3/UL (ref 0–0.2)
BASOPHILS NFR BLD AUTO: 1 %
BILIRUB DIRECT SERPL-MCNC: 0.23 MG/DL (ref 0–0.4)
BILIRUB SERPL-MCNC: 0.8 MG/DL (ref 0–1.2)
BILIRUB UR QL STRIP: NEGATIVE
BUN SERPL-MCNC: 12 MG/DL (ref 8–27)
BUN/CREAT SERPL: 15 (ref 12–28)
CALCIUM SERPL-MCNC: 9.7 MG/DL (ref 8.7–10.3)
CHLORIDE SERPL-SCNC: 105 MMOL/L (ref 96–106)
CHOLEST SERPL-MCNC: 214 MG/DL (ref 100–199)
CO2 SERPL-SCNC: 25 MMOL/L (ref 20–29)
COLOR UR: YELLOW
CREAT SERPL-MCNC: 0.78 MG/DL (ref 0.57–1)
EOSINOPHIL # BLD AUTO: 0.1 X10E3/UL (ref 0–0.4)
EOSINOPHIL NFR BLD AUTO: 2 %
ERYTHROCYTE [DISTWIDTH] IN BLOOD BY AUTOMATED COUNT: 13.4 % (ref 11.7–15.4)
GLUCOSE SERPL-MCNC: 93 MG/DL (ref 65–99)
GLUCOSE UR QL: NEGATIVE
HCT VFR BLD AUTO: 41.9 % (ref 34–46.6)
HDLC SERPL-MCNC: 90 MG/DL
HGB BLD-MCNC: 14.3 G/DL (ref 11.1–15.9)
HGB UR QL STRIP: NEGATIVE
IMM GRANULOCYTES # BLD AUTO: 0 X10E3/UL (ref 0–0.1)
IMM GRANULOCYTES NFR BLD AUTO: 0 %
INTERPRETATION, 910389: NORMAL
KETONES UR QL STRIP: NEGATIVE
LDLC SERPL CALC-MCNC: 113 MG/DL (ref 0–99)
LEUKOCYTE ESTERASE UR QL STRIP: NEGATIVE
LYMPHOCYTES # BLD AUTO: 1.3 X10E3/UL (ref 0.7–3.1)
LYMPHOCYTES NFR BLD AUTO: 32 %
MCH RBC QN AUTO: 27.6 PG (ref 26.6–33)
MCHC RBC AUTO-ENTMCNC: 34.1 G/DL (ref 31.5–35.7)
MCV RBC AUTO: 81 FL (ref 79–97)
MICRO URNS: NORMAL
MONOCYTES # BLD AUTO: 0.5 X10E3/UL (ref 0.1–0.9)
MONOCYTES NFR BLD AUTO: 11 %
NEUTROPHILS # BLD AUTO: 2.2 X10E3/UL (ref 1.4–7)
NEUTROPHILS NFR BLD AUTO: 54 %
NITRITE UR QL STRIP: NEGATIVE
PH UR STRIP: 5 [PH] (ref 5–7.5)
PLATELET # BLD AUTO: 325 X10E3/UL (ref 150–450)
POTASSIUM SERPL-SCNC: 4.5 MMOL/L (ref 3.5–5.2)
PROT SERPL-MCNC: 6.6 G/DL (ref 6–8.5)
PROT UR QL STRIP: NEGATIVE
RBC # BLD AUTO: 5.19 X10E6/UL (ref 3.77–5.28)
SODIUM SERPL-SCNC: 142 MMOL/L (ref 134–144)
SP GR UR: 1.01 (ref 1–1.03)
T4 FREE SERPL-MCNC: 1.28 NG/DL (ref 0.82–1.77)
TRIGL SERPL-MCNC: 57 MG/DL (ref 0–149)
TSH SERPL DL<=0.005 MIU/L-ACNC: 1.08 UIU/ML (ref 0.45–4.5)
UROBILINOGEN UR STRIP-MCNC: 0.2 MG/DL (ref 0.2–1)
VLDLC SERPL CALC-MCNC: 11 MG/DL (ref 5–40)
WBC # BLD AUTO: 4.2 X10E3/UL (ref 3.4–10.8)

## 2020-03-17 ENCOUNTER — OFFICE VISIT (OUTPATIENT)
Dept: FAMILY MEDICINE CLINIC | Age: 70
End: 2020-03-17

## 2020-03-17 VITALS
HEART RATE: 72 BPM | SYSTOLIC BLOOD PRESSURE: 118 MMHG | TEMPERATURE: 97.6 F | DIASTOLIC BLOOD PRESSURE: 78 MMHG | RESPIRATION RATE: 16 BRPM | OXYGEN SATURATION: 98 % | BODY MASS INDEX: 32.96 KG/M2 | HEIGHT: 67 IN | WEIGHT: 210 LBS

## 2020-03-17 DIAGNOSIS — E55.9 HYPOVITAMINOSIS D: ICD-10-CM

## 2020-03-17 DIAGNOSIS — I10 ESSENTIAL HYPERTENSION: Primary | ICD-10-CM

## 2020-03-17 DIAGNOSIS — Z00.00 MEDICARE ANNUAL WELLNESS VISIT, SUBSEQUENT: ICD-10-CM

## 2020-03-17 DIAGNOSIS — E78.00 PURE HYPERCHOLESTEROLEMIA: ICD-10-CM

## 2020-03-17 NOTE — PROGRESS NOTES
This is the Subsequent Medicare Annual Wellness Exam, performed 12 months or more after the Initial AWV or the last Subsequent AWV    I have reviewed the patient's medical history in detail and updated the computerized patient record. History     Patient Active Problem List   Diagnosis Code    HLD (hyperlipidemia) E78.5    GERD (gastroesophageal reflux disease) K21.9    Sciatica of left side M54.32    Osteoarthritis of both knees M17.0    Vitamin D deficiency E55.9    Advance directive discussed with patient Z70.80    Chronic left-sided low back pain with sciatica M54.40, G89.29    Essential hypertension I10     Past Medical History:   Diagnosis Date    Chicken pox     Chronic left-sided low back pain with sciatica 1/6/2017    Essential hypertension 6/26/2017    GERD (gastroesophageal reflux disease)     HLD (hyperlipidemia) 6/19/2015    Lumbar stenosis     Measles     Osteoarthritis of both knees 6/19/2015    Sciatica of left side 2009    Shoulder pain 4/13    Left of back going up to the shoulder    Vitamin D deficiency       Past Surgical History:   Procedure Laterality Date    HX GYN  1980    Fibroid Removal    HX ORTHOPAEDIC Bilateral 1981    Knee arthroscopy      Current Outpatient Medications   Medication Sig Dispense Refill    amLODIPine (NORVASC) 2.5 mg tablet TAKE 1 TABLET EVERY DAY 90 Tab 0    atorvastatin (LIPITOR) 20 mg tablet TAKE 1 TABLET EVERY DAY 90 Tab 1    cycloSPORINE (RESTASIS) 0.05 % dpet Administer 1 Drop to both eyes every twelve (12) hours.  omeprazole (PRILOSEC) 20 mg capsule Take 1 Cap by mouth daily. 90 Cap 2    cholecalciferol (VITAMIN D3) 1,000 unit cap Take 4,000 Units by mouth daily.  Blood Pressure Monitor (BLOOD PRESSURE KIT) kit For daily use. Dx: I99.8 1 Kit 0    coenzyme q10 10 mg cap Take  by mouth.        No Known Allergies    Family History   Problem Relation Age of Onset    Diabetes Mother      Social History     Tobacco Use    Smoking status: Former Smoker     Packs/day: 1.00     Last attempt to quit: 1993     Years since quittin.6    Smokeless tobacco: Never Used   Substance Use Topics    Alcohol use: Yes     Comment: once a month, 1 mixed drink       Depression Risk Factor Screening:     3 most recent PHQ Screens 3/17/2020   Little interest or pleasure in doing things Not at all   Feeling down, depressed, irritable, or hopeless Not at all   Total Score PHQ 2 0       Alcohol Risk Factor Screening:   Do you average 1 drink per night or more than 7 drinks a week:  No    On any one occasion in the past three months have you have had more than 3 drinks containing alcohol:  No      Functional Ability and Level of Safety:   Hearing: Hearing is good. Activities of Daily Living: The home contains: no safety equipment. Patient does total self care    Ambulation: with difficulty, uses a walker    Fall Risk:  Fall Risk Assessment, last 12 mths 3/17/2020   Able to walk? Yes   Fall in past 12 months? No   Fall with injury? -   Number of falls in past 12 months -   Fall Risk Score -       Abuse Screen:  Patient is not abused    Cognitive Screening   Has your family/caregiver stated any concerns about your memory: no      Patient Care Team   Patient Care Team:  Milad Galeana MD as PCP - General (Internal Medicine)  Milad Galeana MD as PCP - [facility] Empaneled Provider  Rachel Gay MD (Surgery)  Spring Grimm MD (Obstetrics & Gynecology)  Ruby Arredondo MD (Ophthalmology)    Assessment/Plan   Education and counseling provided:  Are appropriate based on today's review and evaluation    Diagnoses and all orders for this visit:    1. Medicare annual wellness visit, subsequent    2. Essential hypertension    3. Pure hypercholesterolemia  -     LIPID PANEL; Future  -     METABOLIC PANEL, BASIC; Future  -     HEPATIC FUNCTION PANEL; Future    4.  Hypovitaminosis D  -     VITAMIN D, 25 HYDROXY; Future        Health Maintenance Due Topic Date Due    Shingrix Vaccine Age 49> (1 of 2) 04/24/1998    Pneumococcal 65+ years (1 of 1 - PPSV23) 04/24/2013    Medicare Yearly Exam  03/12/2020

## 2020-03-17 NOTE — PROGRESS NOTES
Assessment/Plan:    *Diagnoses and all orders for this visit:    1. Essential hypertension    2. Medicare annual wellness visit, subsequent    3. Pure hypercholesterolemia  -     LIPID PANEL; Future  -     METABOLIC PANEL, BASIC; Future  -     HEPATIC FUNCTION PANEL; Future    4. Hypovitaminosis D  -     VITAMIN D, 25 HYDROXY; Future        Will start taking Lipitor daily. F/u 6 mon. BW. The plan was discussed with the patient. The patient verbalized understanding and is in agreement with the plan. All medication potential side effects were discussed with the patient.    -------------------------------------------------------------------------------------------------------------------        Andrew Cazares is a 68 y.o. female and presents with Annual Wellness Visit         Subjective:  Pt here for 6 mon f/u. HTN:  Well controlled. HLD:  Tch and LDL have increased. She admits that she has not been taking her Lipitor every single day, she alternates but does not have a good reason for doing this. Review of Systems - General ROS: pain from generalized arthritis. The problem list was updated as a part of today's visit. Patient Active Problem List   Diagnosis Code    HLD (hyperlipidemia) E78.5    GERD (gastroesophageal reflux disease) K21.9    Sciatica of left side M54.32    Osteoarthritis of both knees M17.0    Vitamin D deficiency E55.9    Advance directive discussed with patient Z70.80    Chronic left-sided low back pain with sciatica M54.40, G89.29    Essential hypertension I10       The PSH, FH were reviewed.         SH:  Social History     Tobacco Use    Smoking status: Former Smoker     Packs/day: 1.00     Last attempt to quit: 1993     Years since quittin.6    Smokeless tobacco: Never Used   Substance Use Topics    Alcohol use: Yes     Comment: once a month, 1 mixed drink    Drug use: No       Medications/Allergies:  Current Outpatient Medications on File Prior to Visit   Medication Sig Dispense Refill    amLODIPine (NORVASC) 2.5 mg tablet TAKE 1 TABLET EVERY DAY 90 Tab 0    atorvastatin (LIPITOR) 20 mg tablet TAKE 1 TABLET EVERY DAY 90 Tab 1    cycloSPORINE (RESTASIS) 0.05 % dpet Administer 1 Drop to both eyes every twelve (12) hours.  omeprazole (PRILOSEC) 20 mg capsule Take 1 Cap by mouth daily. 90 Cap 2    cholecalciferol (VITAMIN D3) 1,000 unit cap Take 4,000 Units by mouth daily.  Blood Pressure Monitor (BLOOD PRESSURE KIT) kit For daily use. Dx: I99.8 1 Kit 0    coenzyme q10 10 mg cap Take  by mouth. No current facility-administered medications on file prior to visit. No Known Allergies      Health Maintenance:   Health Maintenance   Topic Date Due    Shingrix Vaccine Age 49> (1 of 2) 04/24/1998    Pneumococcal 65+ years (1 of 1 - PPSV23) 04/24/2013    Medicare Yearly Exam  03/12/2020    Lipid Screen  03/13/2021    GLAUCOMA SCREENING Q2Y  03/15/2021    Breast Cancer Screen Mammogram  04/25/2021    Colonoscopy  12/31/2024    DTaP/Tdap/Td series (2 - Td) 10/13/2026    Hepatitis C Screening  Completed    Bone Densitometry (Dexa) Screening  Completed    Influenza Age 5 to Adult  Completed       Objective:  Visit Vitals  /78   Pulse 72   Temp 97.6 °F (36.4 °C) (Oral)   Resp 16   Ht 5' 7\" (1.702 m)   Wt 210 lb (95.3 kg)   SpO2 98%   BMI 32.89 kg/m²          Nurses notes and VS reviewed. Physical Examination: General appearance - alert, well appearing, and in no distress  Mouth - mucous membranes moist, pharynx normal without lesions  Neck - supple, no significant adenopathy  Chest - clear to auscultation, no wheezes, rales or rhonchi, symmetric air entry  CVS exam: normal rate, regular rhythm, normal S1, S2, no murmurs, rubs, clicks or gallops.   Abdomen - soft, nontender, nondistended, no masses or organomegaly  Musculoskeletal - joint pains  Extremities - peripheral pulses normal, no pedal edema, no clubbing or cyanosis          Labwork and Ancillary Studies:    CBC w/Diff  Lab Results   Component Value Date/Time    WBC 4.2 03/13/2020 07:35 AM    HGB 14.3 03/13/2020 07:35 AM    PLATELET 880 78/34/2960 07:35 AM         Basic Metabolic Profile  Lab Results   Component Value Date/Time    Sodium 142 03/13/2020 07:35 AM    Potassium 4.5 03/13/2020 07:35 AM    Chloride 105 03/13/2020 07:35 AM    CO2 25 03/13/2020 07:35 AM    Anion gap 7 03/08/2016 12:00 PM    Glucose 93 03/13/2020 07:35 AM    BUN 12 03/13/2020 07:35 AM    Creatinine 0.78 03/13/2020 07:35 AM    BUN/Creatinine ratio 15 03/13/2020 07:35 AM    GFR est AA 88 03/13/2020 07:35 AM    GFR est non-AA 77 03/13/2020 07:35 AM    Calcium 9.7 03/13/2020 07:35 AM         LFT  Lab Results   Component Value Date/Time    ALT (SGPT) 13 03/13/2020 07:35 AM    AST (SGOT) 15 03/13/2020 07:35 AM    Alk.  phosphatase 78 03/13/2020 07:35 AM    Bilirubin, direct 0.23 03/13/2020 07:35 AM    Bilirubin, total 0.8 03/13/2020 07:35 AM         Cholesterol  Lab Results   Component Value Date/Time    Cholesterol, total 214 (H) 03/13/2020 07:35 AM    HDL Cholesterol 90 03/13/2020 07:35 AM    LDL, calculated 113 (H) 03/13/2020 07:35 AM    Triglyceride 57 03/13/2020 07:35 AM    CHOL/HDL Ratio 3.8 04/04/2016 08:32 AM

## 2020-03-17 NOTE — PATIENT INSTRUCTIONS
Medicare Wellness Visit, Female The best way to live healthy is to have a lifestyle where you eat a well-balanced diet, exercise regularly, limit alcohol use, and quit all forms of tobacco/nicotine, if applicable. Regular preventive services are another way to keep healthy. Preventive services (vaccines, screening tests, monitoring & exams) can help personalize your care plan, which helps you manage your own care. Screening tests can find health problems at the earliest stages, when they are easiest to treat. [facility] follows the current, evidence-based guidelines published by the [facility] ([facility]) when recommending preventive services for our patients. Because we follow these guidelines, sometimes recommendations change over time as research supports it. (For example, mammograms used to be recommended annually. Even though Medicare will still pay for an annual mammogram, the newer guidelines recommend a mammogram every two years for women of average risk). Of course, you and your doctor may decide to screen more often for some diseases, based on your risk and your co-morbidities (chronic disease you are already diagnosed with). Preventive services for you include: - Medicare offers their members a free annual wellness visit, which is time for you and your primary care provider to discuss and plan for your preventive service needs. Take advantage of this benefit every year! 
-All adults over the age of 72 should receive the recommended pneumonia vaccines. Current USPSTF guidelines recommend a series of two vaccines for the best pneumonia protection.  
-All adults should have a flu vaccine yearly and a tetanus vaccine every 10 years.  
-All adults age 48 and older should receive the shingles vaccines (series of two vaccines). -All adults age 38-68 who are overweight should have a diabetes screening test once every three years. -All adults born between [birth year] and 1967 should be screened once for Hepatitis C. 
-Other screening tests and preventive services for persons with diabetes include: an eye exam to screen for diabetic retinopathy, a kidney function test, a foot exam, and stricter control over your cholesterol.  
-Cardiovascular screening for adults with routine risk involves an electrocardiogram (ECG) at intervals determined by your doctor.  
-Colorectal cancer screenings should be done for adults age 54-65 with no increased risk factors for colorectal cancer. There are a number of acceptable methods of screening for this type of cancer. Each test has its own benefits and drawbacks. Discuss with your doctor what is most appropriate for you during your annual wellness visit. The different tests include: colonoscopy (considered the best screening method), a fecal occult blood test, a fecal DNA test, and sigmoidoscopy. 
 
-A bone mass density test is recommended when a woman turns 65 to screen for osteoporosis. This test is only recommended one time, as a screening. Some providers will use this same test as a disease monitoring tool if you already have osteoporosis. -Breast cancer screenings are recommended every other year for women of normal risk, age 54-69. 
-Cervical cancer screenings for women over age 72 are only recommended with certain risk factors. Here is a list of your current Health Maintenance items (your personalized list of preventive services) with a due date: 
Health Maintenance Due Topic Date Due  Shingles Vaccine (1 of 2) 04/24/1998  Pneumococcal Vaccine (1 of 1 - PPSV23) 04/24/2013 Dana Duval Annual Well Visit  03/12/2020

## 2020-03-17 NOTE — PROGRESS NOTES
Kin Weeks is a 68 y.o. female (: 1950) presenting to address:    Chief Complaint   Patient presents with    Annual Wellness Visit       Vitals:    20 0743   BP: 118/78   Pulse: 72   Resp: 16   Temp: 97.6 °F (36.4 °C)   TempSrc: Oral   SpO2: 98%   Weight: 210 lb (95.3 kg)   Height: 5' 7\" (1.702 m)   PainSc:   0 - No pain       Hearing/Vision:      Visual Acuity Screening    Right eye Left eye Both eyes   Without correction:      With correction: 20/50 20/25        Learning Assessment:     Learning Assessment 10/2/2014   PRIMARY LEARNER Patient   HIGHEST LEVEL OF EDUCATION - PRIMARY LEARNER  4 YEARS OF COLLEGE   BARRIERS PRIMARY LEARNER NONE   PRIMARY LANGUAGE ENGLISH   LEARNER PREFERENCE PRIMARY LISTENING   ANSWERED BY pt   RELATIONSHIP OTHER     Depression Screening:     3 most recent PHQ Screens 3/17/2020   Little interest or pleasure in doing things Not at all   Feeling down, depressed, irritable, or hopeless Not at all   Total Score PHQ 2 0     Fall Risk Assessment:     Fall Risk Assessment, last 12 mths 3/17/2020   Able to walk? Yes   Fall in past 12 months? No   Fall with injury? -   Number of falls in past 12 months -   Fall Risk Score -     Abuse Screening:     Abuse Screening Questionnaire 3/17/2020   Do you ever feel afraid of your partner? N   Are you in a relationship with someone who physically or mentally threatens you? N   Is it safe for you to go home? Y     Coordination of Care Questionaire:   1. Have you been to the ER, urgent care clinic since your last visit? Hospitalized since your last visit? NO    2. Have you seen or consulted any other health care providers outside of the [address] [place] [address] since your last visit? Include any pap smears or colon screening. NO    Advanced Directive:   1. Do you have an Advanced Directive? NO    2. Would you like information on Advanced Directives?  NO

## 2020-04-27 DIAGNOSIS — E78.2 MIXED HYPERLIPIDEMIA: ICD-10-CM

## 2020-04-28 RX ORDER — ATORVASTATIN CALCIUM 20 MG/1
TABLET, FILM COATED ORAL
Qty: 90 TAB | Refills: 1 | Status: SHIPPED | OUTPATIENT
Start: 2020-04-28 | End: 2020-09-13

## 2020-09-10 ENCOUNTER — APPOINTMENT (OUTPATIENT)
Dept: FAMILY MEDICINE CLINIC | Age: 70
End: 2020-09-10

## 2020-09-10 DIAGNOSIS — E78.2 MIXED HYPERLIPIDEMIA: ICD-10-CM

## 2020-09-11 LAB
ALBUMIN SERPL-MCNC: 4.2 G/DL (ref 3.7–4.7)
ALP SERPL-CCNC: 90 IU/L (ref 39–117)
ALT SERPL-CCNC: 11 IU/L (ref 0–32)
AST SERPL-CCNC: 15 IU/L (ref 0–40)
BILIRUB DIRECT SERPL-MCNC: 0.22 MG/DL (ref 0–0.4)
BILIRUB SERPL-MCNC: 0.8 MG/DL (ref 0–1.2)
BUN SERPL-MCNC: 9 MG/DL (ref 8–27)
BUN/CREAT SERPL: 15 (ref 12–28)
CALCIUM SERPL-MCNC: 9.3 MG/DL (ref 8.7–10.3)
CHLORIDE SERPL-SCNC: 105 MMOL/L (ref 96–106)
CHOLEST SERPL-MCNC: 189 MG/DL (ref 100–199)
CO2 SERPL-SCNC: 25 MMOL/L (ref 20–29)
CREAT SERPL-MCNC: 0.62 MG/DL (ref 0.57–1)
GLUCOSE SERPL-MCNC: 93 MG/DL (ref 65–99)
HDLC SERPL-MCNC: 79 MG/DL
INTERPRETATION, 910389: NORMAL
LDLC SERPL CALC-MCNC: 97 MG/DL (ref 0–99)
POTASSIUM SERPL-SCNC: 4.3 MMOL/L (ref 3.5–5.2)
PROT SERPL-MCNC: 6.7 G/DL (ref 6–8.5)
SODIUM SERPL-SCNC: 143 MMOL/L (ref 134–144)
TRIGL SERPL-MCNC: 72 MG/DL (ref 0–149)
VLDLC SERPL CALC-MCNC: 13 MG/DL (ref 5–40)

## 2020-09-13 RX ORDER — ATORVASTATIN CALCIUM 20 MG/1
TABLET, FILM COATED ORAL
Qty: 90 TAB | Refills: 1 | Status: SHIPPED | OUTPATIENT
Start: 2020-09-13

## 2020-09-16 ENCOUNTER — OFFICE VISIT (OUTPATIENT)
Dept: FAMILY MEDICINE CLINIC | Age: 70
End: 2020-09-16

## 2020-09-16 VITALS
OXYGEN SATURATION: 100 % | DIASTOLIC BLOOD PRESSURE: 86 MMHG | HEART RATE: 83 BPM | SYSTOLIC BLOOD PRESSURE: 132 MMHG | TEMPERATURE: 97.2 F | HEIGHT: 67 IN | BODY MASS INDEX: 32.33 KG/M2 | RESPIRATION RATE: 16 BRPM | WEIGHT: 206 LBS

## 2020-09-16 DIAGNOSIS — Z23 ENCOUNTER FOR IMMUNIZATION: ICD-10-CM

## 2020-09-16 DIAGNOSIS — E78.00 PURE HYPERCHOLESTEROLEMIA: Primary | ICD-10-CM

## 2020-09-16 DIAGNOSIS — Z23 NEEDS FLU SHOT: ICD-10-CM

## 2020-09-16 DIAGNOSIS — I10 ESSENTIAL HYPERTENSION: ICD-10-CM

## 2020-09-16 DIAGNOSIS — E55.9 HYPOVITAMINOSIS D: ICD-10-CM

## 2020-09-16 NOTE — PATIENT INSTRUCTIONS

## 2020-09-16 NOTE — PROGRESS NOTES
Assessment/Plan:    *Diagnoses and all orders for this visit:    1. Pure hypercholesterolemia  -     CBC WITH AUTOMATED DIFF; Future  -     HEPATIC FUNCTION PANEL; Future  -     LIPID PANEL; Future  -     METABOLIC PANEL, BASIC; Future  -     TSH 3RD GENERATION; Future  -     T4, FREE; Future    2. Essential hypertension    3. Needs flu shot  -     FLU (FLUAD QUAD INFLUENZA VACCINE,QUAD,ADJUVANTED)    4. Encounter for immunization  -     PNEUMOCOCCAL POLYSACCHARIDE VACCINE, 23-VALENT, ADULT OR IMMUNOSUPPRESSED PT DOSE,  -     ADMIN PNEUMOCOCCAL VACCINE    5. Hypovitaminosis D  -     VITAMIN D, 25 HYDROXY; Future        Physical in 6 months. The plan was discussed with the patient. The patient verbalized understanding and is in agreement with the plan. All medication potential side effects were discussed with the patient.    -------------------------------------------------------------------------------------------------------------------        Ramiro Milton is a 65 y.o. female and presents with Hypertension and Cholesterol Problem         Subjective:  Pt here for f/u. HLD:  Well controlled. HTN: well controlled. Pt reported an incident that she has had recently with an intolerance to rice. Says it made her very sick, once from the [employer] and another time from her neighbor. Review of Systems - General ROS: negative         The problem list was updated as a part of today's visit. Patient Active Problem List   Diagnosis Code    HLD (hyperlipidemia) E78.5    GERD (gastroesophageal reflux disease) K21.9    Sciatica of left side M54.32    Osteoarthritis of both knees M17.0    Vitamin D deficiency E55.9    Advance directive discussed with patient Z70.80    Chronic left-sided low back pain with sciatica M54.40, G89.29    Essential hypertension I10       The PSH, FH were reviewed.         SH:  Social History     Tobacco Use    Smoking status: Former Smoker     Packs/day: 1.00 Last attempt to quit: 1993     Years since quittin.1    Smokeless tobacco: Never Used   Substance Use Topics    Alcohol use: Yes     Comment: once a month, 1 mixed drink    Drug use: No       Medications/Allergies:  Current Outpatient Medications on File Prior to Visit   Medication Sig Dispense Refill    atorvastatin (LIPITOR) 20 mg tablet TAKE 1 TABLET EVERY DAY 90 Tab 1    amLODIPine (NORVASC) 2.5 mg tablet TAKE 1 TABLET EVERY DAY 90 Tab 1    cycloSPORINE (RESTASIS) 0.05 % dpet Administer 1 Drop to both eyes every twelve (12) hours.  omeprazole (PRILOSEC) 20 mg capsule Take 1 Cap by mouth daily. 90 Cap 2    cholecalciferol (VITAMIN D3) 1,000 unit cap Take 4,000 Units by mouth daily.  Blood Pressure Monitor (BLOOD PRESSURE KIT) kit For daily use. Dx: I99.8 1 Kit 0    coenzyme q10 10 mg cap Take  by mouth. No current facility-administered medications on file prior to visit. No Known Allergies      Health Maintenance:   Health Maintenance   Topic Date Due    Shingrix Vaccine Age 49> (1 of 2) 1998    Pneumococcal 65+ years (1 of 1 - PPSV23) 2013    Flu Vaccine (1) 2020    Lipid Screen  2021    Medicare Yearly Exam  2021    Breast Cancer Screen Mammogram  2021    GLAUCOMA SCREENING Q2Y  2022    Colonoscopy  2024    DTaP/Tdap/Td series (2 - Td) 10/13/2026    Hepatitis C Screening  Completed    Bone Densitometry (Dexa) Screening  Completed       Objective:  Visit Vitals  /86   Pulse 83   Temp 97.2 °F (36.2 °C) (Oral)   Resp 16   Ht 5' 7\" (1.702 m)   Wt 206 lb (93.4 kg)   SpO2 100%   BMI 32.26 kg/m²          Nurses notes and VS reviewed.       Physical Examination: General appearance - alert, well appearing, and in no distress  Chest - clear to auscultation, no wheezes, rales or rhonchi, symmetric air entry  Heart - normal rate, regular rhythm, normal S1, S2, no murmurs, rubs, clicks or gallops            Labwork and Ancillary Studies:    CBC w/Diff  Lab Results   Component Value Date/Time    WBC 4.2 03/13/2020 07:35 AM    HGB 14.3 03/13/2020 07:35 AM    PLATELET 703 29/60/0102 07:35 AM         Basic Metabolic Profile  Lab Results   Component Value Date/Time    Sodium 143 09/10/2020 08:27 AM    Potassium 4.3 09/10/2020 08:27 AM    Chloride 105 09/10/2020 08:27 AM    CO2 25 09/10/2020 08:27 AM    Anion gap 7 03/08/2016 12:00 PM    Glucose 93 09/10/2020 08:27 AM    BUN 9 09/10/2020 08:27 AM    Creatinine 0.62 09/10/2020 08:27 AM    BUN/Creatinine ratio 15 09/10/2020 08:27 AM    GFR est  09/10/2020 08:27 AM    GFR est non-AA 90 09/10/2020 08:27 AM    Calcium 9.3 09/10/2020 08:27 AM         LFT  Lab Results   Component Value Date/Time    ALT (SGPT) 11 09/10/2020 08:27 AM    Alk.  phosphatase 90 09/10/2020 08:27 AM    Bilirubin, direct 0.22 09/10/2020 08:27 AM    Bilirubin, total 0.8 09/10/2020 08:27 AM         Cholesterol  Lab Results   Component Value Date/Time    Cholesterol, total 189 09/10/2020 08:27 AM    HDL Cholesterol 79 09/10/2020 08:27 AM    LDL, calculated 113 (H) 03/13/2020 07:35 AM    LDL Chol Calc (NIH) 97 09/10/2020 08:27 AM    Triglyceride 72 09/10/2020 08:27 AM    CHOL/HDL Ratio 3.8 04/04/2016 08:32 AM

## 2020-09-16 NOTE — PROGRESS NOTES
Samuel Schultz is a 65 y.o. female (: 1950) presenting to address:    Chief Complaint   Patient presents with    Hypertension    Cholesterol Problem       Vitals:    20 0733   BP: 132/86   Pulse: 83   Resp: 16   Temp: 97.2 °F (36.2 °C)   TempSrc: Oral   SpO2: 100%   Weight: 206 lb (93.4 kg)   Height: 5' 7\" (1.702 m)   PainSc:   6   PainLoc: Knee       Hearing/Vision:   No exam data present    Learning Assessment:     Learning Assessment 10/2/2014   PRIMARY LEARNER Patient   HIGHEST LEVEL OF EDUCATION - PRIMARY LEARNER  4 YEARS OF COLLEGE   BARRIERS PRIMARY LEARNER NONE   PRIMARY LANGUAGE ENGLISH   LEARNER PREFERENCE PRIMARY LISTENING   ANSWERED BY pt   RELATIONSHIP OTHER     Depression Screening:     3 most recent PHQ Screens 3/17/2020   Little interest or pleasure in doing things Not at all   Feeling down, depressed, irritable, or hopeless Not at all   Total Score PHQ 2 0     Fall Risk Assessment:     Fall Risk Assessment, last 12 mths 3/17/2020   Able to walk? Yes   Fall in past 12 months? No   Fall with injury? -   Number of falls in past 12 months -   Fall Risk Score -     Abuse Screening:     Abuse Screening Questionnaire 3/17/2020   Do you ever feel afraid of your partner? N   Are you in a relationship with someone who physically or mentally threatens you? N   Is it safe for you to go home? Y     Coordination of Care Questionaire:   1. Have you been to the ER, urgent care clinic since your last visit? Hospitalized since your last visit? NO    2. Have you seen or consulted any other health care providers outside of the [address] [place] [address] since your last visit? Include any pap smears or colon screening. NO    Advanced Directive:   1. Do you have an Advanced Directive? NO    2. Would you like information on Advanced Directives? NO      Flu shot and pneumovax 23 Immunization/s administered 2020 by Fawad Kern LPN with guardian's consent. Patient tolerated procedure well. No reactions noted.

## 2020-10-18 RX ORDER — AMLODIPINE BESYLATE 2.5 MG/1
TABLET ORAL
Qty: 90 TAB | Refills: 1 | Status: SHIPPED | OUTPATIENT
Start: 2020-10-18

## 2023-03-10 NOTE — PATIENT INSTRUCTIONS
